# Patient Record
Sex: FEMALE | Race: WHITE | NOT HISPANIC OR LATINO | Employment: OTHER | URBAN - METROPOLITAN AREA
[De-identification: names, ages, dates, MRNs, and addresses within clinical notes are randomized per-mention and may not be internally consistent; named-entity substitution may affect disease eponyms.]

---

## 2022-11-29 ENCOUNTER — TRANSFERRED RECORDS (OUTPATIENT)
Dept: MULTI SPECIALTY CLINIC | Facility: CLINIC | Age: 67
End: 2022-11-29

## 2024-04-02 ENCOUNTER — OFFICE VISIT (OUTPATIENT)
Dept: FAMILY MEDICINE | Facility: CLINIC | Age: 69
End: 2024-04-02
Payer: COMMERCIAL

## 2024-04-02 VITALS
WEIGHT: 113 LBS | SYSTOLIC BLOOD PRESSURE: 124 MMHG | HEART RATE: 105 BPM | TEMPERATURE: 97.5 F | BODY MASS INDEX: 22.78 KG/M2 | RESPIRATION RATE: 18 BRPM | DIASTOLIC BLOOD PRESSURE: 78 MMHG | OXYGEN SATURATION: 99 % | HEIGHT: 59 IN

## 2024-04-02 DIAGNOSIS — Z12.11 SCREEN FOR COLON CANCER: ICD-10-CM

## 2024-04-02 DIAGNOSIS — N90.4 LICHEN SCLEROSUS OF VULVA: ICD-10-CM

## 2024-04-02 DIAGNOSIS — S76.011A STRAIN OF HIP AND THIGH, RIGHT, INITIAL ENCOUNTER: ICD-10-CM

## 2024-04-02 DIAGNOSIS — Z78.0 POST-MENOPAUSAL: ICD-10-CM

## 2024-04-02 DIAGNOSIS — Z13.1 SCREENING FOR DIABETES MELLITUS: ICD-10-CM

## 2024-04-02 DIAGNOSIS — R73.09 OTHER ABNORMAL GLUCOSE: ICD-10-CM

## 2024-04-02 DIAGNOSIS — S16.1XXA STRAIN OF NECK MUSCLE, INITIAL ENCOUNTER: ICD-10-CM

## 2024-04-02 DIAGNOSIS — Z91.89 AT RISK FOR DECREASED BONE DENSITY: ICD-10-CM

## 2024-04-02 DIAGNOSIS — Z13.220 SCREENING FOR HYPERLIPIDEMIA: ICD-10-CM

## 2024-04-02 DIAGNOSIS — Z12.4 CERVICAL CANCER SCREENING: ICD-10-CM

## 2024-04-02 DIAGNOSIS — Z00.00 ENCOUNTER FOR MEDICARE ANNUAL WELLNESS EXAM: Primary | ICD-10-CM

## 2024-04-02 DIAGNOSIS — S76.911A STRAIN OF HIP AND THIGH, RIGHT, INITIAL ENCOUNTER: ICD-10-CM

## 2024-04-02 DIAGNOSIS — Z91.89 DIETHYLSTILBESTROL (DES) EXPOSURE AS FETUS: ICD-10-CM

## 2024-04-02 DIAGNOSIS — Z80.8 FAMILY HISTORY OF SKIN CANCER: ICD-10-CM

## 2024-04-02 DIAGNOSIS — Z12.31 ENCOUNTER FOR SCREENING MAMMOGRAM FOR BREAST CANCER: ICD-10-CM

## 2024-04-02 PROBLEM — J30.2 SEASONAL ALLERGIC RHINITIS DUE TO FUNGAL SPORES: Status: ACTIVE | Noted: 2018-02-12

## 2024-04-02 LAB
CHOLEST SERPL-MCNC: 248 MG/DL
FASTING STATUS PATIENT QL REPORTED: YES
HBA1C MFR BLD: 6 % (ref 0–5.6)
HDLC SERPL-MCNC: 80 MG/DL
LDLC SERPL CALC-MCNC: 147 MG/DL
NONHDLC SERPL-MCNC: 168 MG/DL
TRIGL SERPL-MCNC: 106 MG/DL

## 2024-04-02 PROCEDURE — 80061 LIPID PANEL: CPT | Performed by: NURSE PRACTITIONER

## 2024-04-02 PROCEDURE — 36415 COLL VENOUS BLD VENIPUNCTURE: CPT | Performed by: NURSE PRACTITIONER

## 2024-04-02 PROCEDURE — G0123 SCREEN CERV/VAG THIN LAYER: HCPCS | Performed by: NURSE PRACTITIONER

## 2024-04-02 PROCEDURE — 83036 HEMOGLOBIN GLYCOSYLATED A1C: CPT | Performed by: NURSE PRACTITIONER

## 2024-04-02 PROCEDURE — G0438 PPPS, INITIAL VISIT: HCPCS | Performed by: NURSE PRACTITIONER

## 2024-04-02 PROCEDURE — 87624 HPV HI-RISK TYP POOLED RSLT: CPT | Mod: GZ | Performed by: NURSE PRACTITIONER

## 2024-04-02 PROCEDURE — 99204 OFFICE O/P NEW MOD 45 MIN: CPT | Mod: 25 | Performed by: NURSE PRACTITIONER

## 2024-04-02 RX ORDER — CLOBETASOL PROPIONATE 0.5 MG/G
OINTMENT TOPICAL AT BEDTIME
Qty: 60 G | Refills: 11 | Status: SHIPPED | OUTPATIENT
Start: 2024-04-02

## 2024-04-02 SDOH — HEALTH STABILITY: PHYSICAL HEALTH: ON AVERAGE, HOW MANY DAYS PER WEEK DO YOU ENGAGE IN MODERATE TO STRENUOUS EXERCISE (LIKE A BRISK WALK)?: 2 DAYS

## 2024-04-02 ASSESSMENT — SOCIAL DETERMINANTS OF HEALTH (SDOH): HOW OFTEN DO YOU GET TOGETHER WITH FRIENDS OR RELATIVES?: MORE THAN THREE TIMES A WEEK

## 2024-04-02 NOTE — PATIENT INSTRUCTIONS
Try voltaren gel to foot pad to see if this improves pain.  2.  Make appointment for Physical Therapy for hip and neck.  3. Use topical steroid cream as prescribed for lichen sclerosis  4.   ear wax drop (Debrox) Apply 4 drops in the right ear once a week to prevent wax build up. Rinse ear out in the shower.  5. Dermatology referral placed for skin check.  6. Make appointment for mammogram.  Preventive Care Advice.  This is general advice given by our system to help you stay healthy. However, your care team may have specific advice just for you. Please talk to your care team about your preventive care needs.  Nutrition  Eat 5 or more servings of fruits and vegetables each day.  Try wheat bread, brown rice and whole grain pasta (instead of white bread, rice, and pasta).  Get enough calcium and vitamin D. Check the label on foods and aim for 100% of the RDA (recommended daily allowance).  Lifestyle  Exercise at least 150 minutes each week   (30 minutes a day, 5 days a week).  Do muscle strengthening activities 2 days a week. These help control your weight and prevent disease.  No smoking.  Wear sunscreen to prevent skin cancer.  Have a dental exam and cleaning every 6 months.  Yearly exams  See your health care team every year to talk about:  Any changes in your health.  Any medicines your care team has prescribed.  Preventive care, family planning, and ways to prevent chronic diseases.  Shots (vaccines)   HPV shots (up to age 26), if you've never had them before.  Hepatitis B shots (up to age 59), if you've never had them before.  COVID-19 shot: Get this shot when it's due.  Flu shot: Get a flu shot every year.  Tetanus shot: Get a tetanus shot every 10 years.  Pneumococcal, hepatitis A, and RSV shots: Ask your care team if you need these based on your risk.  Shingles shot (for age 50 and up).  General health tests  Diabetes screening:  Starting at age 35, Get screened for diabetes at least every 3 years.  If  you are younger than age 35, ask your care team if you should be screened for diabetes.  Cholesterol test: At age 39, start having a cholesterol test every 5 years, or more often if advised.  Bone density scan (DEXA): At age 50, ask your care team if you should have this scan for osteoporosis (brittle bones).  Hepatitis C: Get tested at least once in your life.  STIs (sexually transmitted infections)  Before age 24: Ask your care team if you should be screened for STIs.  After age 24: Get screened for STIs if you're at risk. You are at risk for STIs (including HIV) if:  You are sexually active with more than one person.  You don't use condoms every time.  You or a partner was diagnosed with a sexually transmitted infection.  If you are at risk for HIV, ask about PrEP medicine to prevent HIV.  Get tested for HIV at least once in your life, whether you are at risk for HIV or not.  Cancer screening tests  Cervical cancer screening: If you have a cervix, begin getting regular cervical cancer screening tests at age 21. Most people who have regular screenings with normal results can stop after age 65. Talk about this with your provider.  Breast cancer scan (mammogram): If you've ever had breasts, begin having regular mammograms starting at age 40. This is a scan to check for breast cancer.  Colon cancer screening: It is important to start screening for colon cancer at age 45.  Have a colonoscopy test every 10 years (or more often if you're at risk) Or, ask your provider about stool tests like a FIT test every year or Cologuard test every 3 years.  To learn more about your testing options, visit: https://www.[employer]/326752.pdf.  For help making a decision, visit: https://bit.ly/mq39591.  Prostate cancer screening test: If you have a prostate and are age 55 to 69, ask your provider if you would benefit from a yearly prostate cancer screening test.  Lung cancer screening: If you are a current or former smoker age 50 to  80, ask your care team if ongoing lung cancer screenings are right for you.  For informational purposes only. Not to replace the advice of your health care provider. Copyright   2023 [facility]. All rights reserved. Clinically reviewed by the [facility]. Yugma 516257 - REV 01/24.    Preventing Falls: Care Instructions  Injuries and health problems such as trouble walking or poor eyesight can increase your risk of falling. So can some medicines. But there are things you can do to help prevent falls. You can exercise to get stronger. You can also arrange your home to make it safer.    Talk to your doctor about the medicines you take. Ask if any of them increase the risk of falls and whether they can be changed or stopped.   Try to exercise regularly. It can help improve your strength and balance. This can help lower your risk of falling.     Practice fall safety and prevention.    Wear low-heeled shoes that fit well and give your feet good support. Talk to your doctor if you have foot problems that make this hard.  Carry a cellphone or wear a medical alert device that you can use to call for help.  Use stepladders instead of chairs to reach high objects. Don't climb if you're at risk for falls. Ask for help, if needed.  Wear the correct eyeglasses, if you need them.    Make your home safer.    Remove rugs, cords, clutter, and furniture from walkways.  Keep your house well lit. Use night-lights in hallways and bathrooms.  Install and use sturdy handrails on stairways.  Wear nonskid footwear, even inside. Don't walk barefoot or in socks without shoes.    Be safe outside.    Use handrails, curb cuts, and ramps whenever possible.  Keep your hands free by using a shoulder bag or backpack.  Try to walk in well-lit areas. Watch out for uneven ground, changes in pavement, and debris.  Be careful in the winter. Walk on the grass or gravel when sidewalks are slippery. Use de-icer on  "steps and walkways. Add non-slip devices to shoes.    Put grab bars and nonskid mats in your shower or tub and near the toilet. Try to use a shower chair or bath bench when bathing.   Get into a tub or shower by putting in your weaker leg first. Get out with your strong side first. Have a phone or medical alert device in the bathroom with you.   Where can you learn more?  Go to https://www.[employer].net/patiented  Enter G117 in the search box to learn more about \"Preventing Falls: Care Instructions.\"  Current as of: July 17, 2023               Content Version: 14.0    3173-7015 [employer].   Care instructions adapted under license by your healthcare professional. If you have questions about a medical condition or this instruction, always ask your healthcare professional. [employer] disclaims any warranty or liability for your use of this information.      Learning About Stress  What is stress?     Stress is your body's response to a hard situation. Your body can have a physical, emotional, or mental response. Stress is a fact of life for most people, and it affects everyone differently. What causes stress for you may not be stressful for someone else.  A lot of things can cause stress. You may feel stress when you go on a job interview, take a test, or run a race. This kind of short-term stress is normal and even useful. It can help you if you need to work hard or react quickly. For example, stress can help you finish an important job on time.  Long-term stress is caused by ongoing stressful situations or events. Examples of long-term stress include long-term health problems, ongoing problems at work, or conflicts in your family. Long-term stress can harm your health.  How does stress affect your health?  When you are stressed, your body responds as though you are in danger. It makes hormones that speed up your heart, make you breathe faster, and give you a burst of energy. This is " called the fight-or-flight stress response. If the stress is over quickly, your body goes back to normal and no harm is done.  But if stress happens too often or lasts too long, it can have bad effects. Long-term stress can make you more likely to get sick, and it can make symptoms of some diseases worse. If you tense up when you are stressed, you may develop neck, shoulder, or low back pain. Stress is linked to high blood pressure and heart disease.  Stress also harms your emotional health. It can make you young, tense, or depressed. Your relationships may suffer, and you may not do well at work or school.  What can you do to manage stress?  You can try these things to help manage stress:   Do something active. Exercise or activity can help reduce stress. Walking is a great way to get started. Even everyday activities such as housecleaning or yard work can help.  Try yoga or marcy chi. These techniques combine exercise and meditation. You may need some training at first to learn them.  Do something you enjoy. For example, listen to music or go to a movie. Practice your hobby or do volunteer work.  Meditate. This can help you relax, because you are not worrying about what happened before or what may happen in the future.  Do guided imagery. Imagine yourself in any setting that helps you feel calm. You can use online videos, books, or a teacher to guide you.  Do breathing exercises. For example:  From a standing position, bend forward from the waist with your knees slightly bent. Let your arms dangle close to the floor.  Breathe in slowly and deeply as you return to a standing position. Roll up slowly and lift your head last.  Hold your breath for just a few seconds in the standing position.  Breathe out slowly and bend forward from the waist.  Let your feelings out. Talk, laugh, cry, and express anger when you need to. Talking with supportive friends or family, a counselor, or a johnathon leader about your feelings is a  "healthy way to relieve stress. Avoid discussing your feelings with people who make you feel worse.  Write. It may help to write about things that are bothering you. This helps you find out how much stress you feel and what is causing it. When you know this, you can find better ways to cope.  What can you do to prevent stress?  You might try some of these things to help prevent stress:  Manage your time. This helps you find time to do the things you want and need to do.  Get enough sleep. Your body recovers from the stresses of the day while you are sleeping.  Get support. Your family, friends, and community can make a difference in how you experience stress.  Limit your news feed. Avoid or limit time on social media or news that may make you feel stressed.  Do something active. Exercise or activity can help reduce stress. Walking is a great way to get started.  Where can you learn more?  Go to https://www.[employer].net/patiented  Enter N032 in the search box to learn more about \"Learning About Stress.\"  Current as of: October 24, 2023               Content Version: 14.0    8310-6302 [employer].   Care instructions adapted under license by your healthcare professional. If you have questions about a medical condition or this instruction, always ask your healthcare professional. [employer] disclaims any warranty or liability for your use of this information.      "

## 2024-04-02 NOTE — PROGRESS NOTES
Preventive Care Visit  [facility]  Marnie Mitchell NP, Family Medicine  Apr 2, 2024    Assessment & Plan     Encounter for Medicare annual wellness exam  Mammogram, DEXA scan, A1C and lipids ordered for screening.  Paperwork given for advance care planning.  She declines risk for Hep B vaccination.  Reviewed preventative health recommendations and advised follow-up in 1 year for annual wellness exam.  - REVIEW OF HEALTH MAINTENANCE PROTOCOL ORDERS  - PRIMARY CARE FOLLOW-UP SCHEDULING; Future    Screening for diabetes mellitus  - Hemoglobin A1c; Future  - Hemoglobin A1c    Screening for hyperlipidemia  - Lipid panel reflex to direct LDL Non-fasting; Future  - Lipid panel reflex to direct LDL Non-fasting    Encounter for screening mammogram for breast cancer  - MA Screen Bilateral w/Deshawn; Future    Screen for colon cancer  Updated her recheck in health maintenance    At risk for decreased bone density  Dexa scan ordered    Post-menopausal  Dexa scan ordered      Diethylstilbestrol (GAY) exposure as fetus  PAP will need to be done yearly due to this exposure until she reaches an age that she will not need to worry about this due to health conditions.  - Pap screen with HPV - recommended age 30 - 65 years  - HPV Hold (Lab Only)    Cervical cancer screening  See note above.  - Pap screen with HPV - recommended age 30 - 65 years  - HPV Hold (Lab Only)    Strain of neck muscle, initial encounter  Physical Therapy ordered for evaluation and treatment of neck strain.  - Physical Therapy  Referral; Future    Strain of hip and thigh, right, initial encounter  Physical Therapy ordered for upper hip and thigh strain.  - Physical Therapy  Referral; Future    Family history of skin cancer  Dermatology referral placed for skin check per patient request.  - Adult Dermatology  Referral; Future    Lichen sclerosus of vulva  Treating with clobetasol at bedtime daily for 3  months and then reduce to 3 times weekly, then 1-2 times weekly to control symptoms.  - clobetasol (TEMOVATE) 0.05 % external ointment; Apply topically at bedtime For 3 months, then reduce to 3 times weekly and continue to reduce to what keeps symptoms at bay 1-2 time weekly    Other abnormal glucose  - Hemoglobin A1c; Future  - Hemoglobin A1c    Patient has been advised of split billing requirements and indicates understanding: Yes    Counseling  Appropriate preventive services were discussed with this patient, including applicable screening as appropriate for fall prevention, nutrition, physical activity, Tobacco-use cessation, weight loss and cognition.  Checklist reviewing preventive services available has been given to the patient.  Reviewed patient's diet, addressing concerns and/or questions.   She is at risk for lack of exercise and has been provided with information to increase physical activity for the benefit of her well-being.   The patient was instructed to see the dentist every 6 months.       See Patient Instructions    Subjective   Magui is a 68 year old, presenting for the following:  Wellness Visit        4/2/2024    10:23 AM   Additional Questions   Roomed by rmb   Accompanied by self         4/2/2024    10:23 AM   Patient Reported Additional Medications   Patient reports taking the following new medications none         Via the Health Maintenance questionnaire, the patient has reported the following services have been completed -Mammogram-Colonscopy, this information has been sent to the abstraction team.  Health Care Directive  Patient does not have a Health Care Directive or Living Will: Discussed advance care planning with patient; information given to patient to review.    HPI        4/2/2024   General Health   How would you rate your overall physical health? Excellent   Feel stress (tense, anxious, or unable to sleep) To some extent   (!) STRESS CONCERN      4/2/2024   Nutrition   Diet:  Regular (no restrictions)         4/2/2024   Exercise   Days per week of moderate/strenous exercise 2 days   (!) EXERCISE CONCERN      4/2/2024   Social Factors   Frequency of gathering with friends or relatives More than three times a week   Worry food won't last until get money to buy more No   Food not last or not have enough money for food? No   Do you have housing?  Yes   Are you worried about losing your housing? No   Lack of transportation? No   Unable to get utilities (heat,electricity)? No         4/2/2024   Activities of Daily Living- Home Safety   Needs help with the following daily activites None of the above   Safety concerns in the home None of the above         4/2/2024   Dental   Dentist two times every year? (!) NO   Making a dentist appointment.      4/2/2024   Hearing Screening   Hearing concerns? None of the above         4/2/2024   Driving Risk Screening   Patient/family members have concerns about driving No         4/2/2024   General Alertness/Fatigue Screening   Have you been more tired than usual lately? No         4/2/2024   Urinary Incontinence Screening   Bothered by leaking urine in past 6 months No         4/2/2024   TB Screening   Were you born outside of the [place]? No         Today's PHQ-2 Score:       4/2/2024    10:07 AM   PHQ-2 ( 1999 Pf[employer])   Q1: Little interest or pleasure in doing things 0   Q2: Feeling down, depressed or hopeless 0   PHQ-2 Score 0   Q1: Little interest or pleasure in doing things Not at all   Q2: Feeling down, depressed or hopeless Not at all   PHQ-2 Score 0           4/2/2024   Substance Use   Alcohol more than 3/day or more than 7/wk Not Applicable   Do you have a current opioid prescription? No   How severe/bad is pain from 1 to 10? 1/10   Do you use any other substances recreationally? No     Social History     Tobacco Use    Smoking status: Never    Smokeless tobacco: Never   Vaping Use    Vaping Use: Never used   Substance Use Topics    Alcohol use: Never     Drug use: Never      Mammogram Screening - Mammogram every 1 years updated in Health Maintenance based on mutual decision making    ASCVD Risk   The ASCVD Risk score (Aidee RENNER, et al., 2019) failed to calculate for the following reasons:    Cannot find a previous HDL lab    Cannot find a previous total cholesterol lab    The BP treatment status is invalid    Reviewed and updated as needed this visit by Provider    Allergies  Meds  Problems  Med Hx  Surg Hx  Fam Hx   Sexual   Activity          History reviewed. No pertinent past medical history.  Past Surgical History:   Procedure Laterality Date    right shoulder arthroscopy       Lab work is in process  Labs reviewed in EPIC  BP Readings from Last 3 Encounters:   04/02/24 (!) 152/78    Wt Readings from Last 3 Encounters:   04/02/24 51.3 kg (113 lb)                  Patient Active Problem List   Diagnosis    Seasonal allergic rhinitis due to fungal spores    Diethylstilbestrol (GAY) exposure as fetus     Past Surgical History:   Procedure Laterality Date    right shoulder arthroscopy         Social History     Tobacco Use    Smoking status: Never    Smokeless tobacco: Never   Substance Use Topics    Alcohol use: Never     Family History   Problem Relation Age of Onset    Hypertension Mother     Prostate Cancer Father     Brain Tumor Father          Current Outpatient Medications   Medication Sig Dispense Refill    Calcium Carbonate-Vitamin D 250-3.125 MG-MCG TABS Take  by mouth two times a day.       Allergies   Allergen Reactions    Dust Mite Extract Unknown     Stuffy nose    Other reaction(s): *Unknown    Kiwi Extract      Lips swell    Latex Other (See Comments)     Other reaction(s): *Unknown   Sensitivity due to Kiwi allergy    Sensitivity due to Kiwi allergy    Mold Unknown     Other reaction(s): *Unknown   Stuffy nose    Molds & Smuts      Stuffy nose    Nsaids      GI upset    Other Environmental Allergy      kiwi, dust, molds, aerosol  sprays    Other Food Allergy      kiwi, dust, molds, aerosol sprays     Current providers sharing in care for this patient include:  Patient Care Team:  Marnie Mitchell, NP as PCP - General (Family Medicine)    The following health maintenance items are reviewed in Epic and correct as of today:  Health Maintenance   Topic Date Due    DEXA  Never done    ADVANCE CARE PLANNING  Never done    MAMMO SCREENING  Never done    GLUCOSE  Never done    HEPATITIS C SCREENING  Never done    LIPID  Never done    IPV IMMUNIZATION (2 of 3 - Adult catch-up series) 05/31/2012    ANNUAL REVIEW OF HM ORDERS  04/02/2025    FALL RISK ASSESSMENT  04/02/2025    DTAP/TDAP/TD IMMUNIZATION (2 - Td or Tdap) 06/19/2025    COLORECTAL CANCER SCREENING  08/15/2026    PHQ-2 (once per calendar year)  Completed    INFLUENZA VACCINE  Completed    Pneumococcal Vaccine: 65+ Years  Completed    ZOSTER IMMUNIZATION  Completed    RSV VACCINE (Pregnancy & 60+)  Completed    COVID-19 Vaccine  Completed    HPV IMMUNIZATION  Aged Out    MENINGITIS IMMUNIZATION  Aged Out    RSV MONOCLONAL ANTIBODY  Aged Out         Review of Systems  CONSTITUTIONAL: NEGATIVE for fever, chills, change in weight  INTEGUMENTARY/SKIN: POSITIVE for folliculitis after using hair products on scalp, labial itching  ENT/MOUTH: NEGATIVE for ear, mouth and throat problems  RESP: NEGATIVE for significant cough or SOB  CV: NEGATIVE for chest pain, palpitations or peripheral edema  GI: NEGATIVE for nausea, abdominal pain, heartburn, or change in bowel habits  : normal menstrual cycles  MUSCULOSKELETAL: NEGATIVE for significant arthralgias or myalgia  NEURO: NEGATIVE for weakness, dizziness or paresthesias  ENDOCRINE: NEGATIVE for temperature intolerance, skin/hair changes  HEME/ALLERGY/IMMUNE: NEGATIVE for bleeding problems  PSYCHIATRIC: NEGATIVE for changes in mood or affect  ROS otherwise negative     Objective    Exam  BP (!) 152/78   Pulse 105   Temp 97.5  F (36.4  C)  "(Tympanic)   Resp 18   Ht 1.488 m (4' 10.58\")   Wt 51.3 kg (113 lb)   SpO2 99%   BMI 23.15 kg/m     Estimated body mass index is 23.15 kg/m  as calculated from the following:    Height as of this encounter: 1.488 m (4' 10.58\").    Weight as of this encounter: 51.3 kg (113 lb).    Physical Exam  GENERAL: alert and no distress  EYES: Eyes grossly normal to inspection, PERRL and conjunctivae and sclerae normal  HENT: ear canals and TM's normal, nose and mouth without ulcers or lesions  NECK: no adenopathy, no asymmetry, masses, or scars  RESP: lungs clear to auscultation - no rales, rhonchi or wheezes  CV: regular rate and rhythm, normal S1 S2, no S3 or S4, no murmur, click or rub, no peripheral edema  ABDOMEN: soft, nontender, no hepatosplenomegaly, no masses and bowel sounds normal   (female): normal female external genitalia, normal urethral meatus, normal vaginal mucosa, normal cervix without masses or discharge; PAP collected on exam  MS: no gross musculoskeletal defects noted, no edema  SKIN: no suspicious lesions or rashes  NEURO: Normal strength and tone, mentation intact and speech normal  BACK: no CVA tenderness, no paralumbar tenderness  PSYCH: mentation appears normal, affect normal/bright  LYMPH: normal ant/post cervical, supraclavicular nodes  inguinal: no adenopathy         4/2/2024   Mini Cog   Clock Draw Score 2 Normal   3 Item Recall 3 objects recalled   Mini Cog Total Score 5     Signed Electronically by: Marnie Mitchell NP    "

## 2024-04-02 NOTE — LETTER
April 4, 2024      Magui Avalos  [address] [facility] [address]        Dear ,    We are writing to inform you of your test results.    Test results indicate you may require additional follow up, see comment below.    our A1C shows you are prediabetic.  I am putting in a diabetic education referral to discuss what this is and how to keep from developing this into diabetes with diet changes.     Your lipids show slight elevation.     High Cholesterol     We think about cholesterol differently than we did several years ago.     1. The first goal is to improve your lifestyle.  Do not smoke. Exercise for 20-30 minutes most days of the weeks.  Follow a healthy diet with at least 5 servings of fresh, whole fruits and vegetables per day, whole grains, nuts, seeds, legumes, high fiber foods, limit sugar and processed foods, drink water.  Good article on healthy eating     http://www.iPerceptions/2018/03/ultimate-conversation-on-healthy-eating-and-nutrition.html     2.  The second goal is to decrease the risk of cardiovascular events - heart attack and stroke.  Reducing absolute levels of cholesterol is no longer a therapeutic goal.  The 2013 American College of Cardiology and American Heart Association guidelines no longer base treatment decisions on these values; instead, they focus on identifying high- and medium-risk people who would benefit from treatment.  We can calculate your risk:  you are at 6.3% and we only treat with medication over 7%.     Work on your diet over the next year to reduce cholesterol.  We will recheck this again in a year.     Marnie Mitchell NP on 4/4/2024 at 12:45 PM     Resulted Orders   Pap screen with HPV - recommended age 30 - 65 years   Result Value Ref Range    Interpretation        Negative for Intraepithelial Lesion or Malignancy (NILM)    Comment         Papanicolaou Test Limitations:  Cervical cytology is a screening test with limited sensitivity, and regular  screening is critical for cancer prevention.  Pap tests are primarily effective for the diagnosis/prevention of squamous cell carcinoma, not adenocarcinoma or other cancers.        Specimen Adequacy       Satisfactory for evaluation.  Specimen was unable to be imaged on the [employer] Slide .  Transformation zone component absent, atrophy    Clinical Information       post-menopausal      LMP/Menopause Date       [2012      Reflex Testing Yes regardless of result     Previous Abnormal?       No      Performing Labs       The technical component of this testing was completed at [facility]     Lipid panel reflex to direct LDL Non-fasting   Result Value Ref Range    Cholesterol 248 (H) <200 mg/dL    Triglycerides 106 <150 mg/dL    Direct Measure HDL 80 >=50 mg/dL    LDL Cholesterol Calculated 147 (H) <=100 mg/dL    Non HDL Cholesterol 168 (H) <130 mg/dL    Patient Fasting > 8hrs? Yes     Narrative    Cholesterol  Desirable:  <200 mg/dL    Triglycerides  Normal:  Less than 150 mg/dL  Borderline High:  150-199 mg/dL  High:  200-499 mg/dL  Very High:  Greater than or equal to 500 mg/dL    Direct Measure HDL  Female:  Greater than or equal to 50 mg/dL   Male:  Greater than or equal to 40 mg/dL    LDL Cholesterol  Desirable:  <100mg/dL  Above Desirable:  100-129 mg/dL   Borderline High:  130-159 mg/dL   High:  160-189 mg/dL   Very High:  >= 190 mg/dL    Non HDL Cholesterol  Desirable:  130 mg/dL  Above Desirable:  130-159 mg/dL  Borderline High:  160-189 mg/dL  High:  190-219 mg/dL  Very High:  Greater than or equal to 220 mg/dL   Hemoglobin A1c   Result Value Ref Range    Hemoglobin A1C 6.0 (H) 0.0 - 5.6 %      Comment:      Normal <5.7%   Prediabetes 5.7-6.4%    Diabetes 6.5% or higher     Note: Adopted from ADA consensus guidelines.       If you have any questions or concerns, please call the clinic at the number listed above.        Sincerely,      Marnie Mitchell NP

## 2024-04-02 NOTE — LETTER
April 9, 2024      Magui S Robbie  [address] [facility] [address]        Dear MsJace,    We are writing to inform you of your test results.    Your PAP and HPV are normal.  Plan to follow-up in 1 year.     Resulted Orders   Pap screen with HPV - recommended age 30 - 65 years   Result Value Ref Range    Interpretation        Negative for Intraepithelial Lesion or Malignancy (NILM)    Comment         Papanicolaou Test Limitations:  Cervical cytology is a screening test with limited sensitivity, and regular screening is critical for cancer prevention.  Pap tests are primarily effective for the diagnosis/prevention of squamous cell carcinoma, not adenocarcinoma or other cancers.        Specimen Adequacy       Satisfactory for evaluation.  Specimen was unable to be imaged on the [employer] Slide .  Transformation zone component absent, atrophy    Clinical Information       post-menopausal      LMP/Menopause Date       [2012      Reflex Testing Yes regardless of result     Previous Abnormal?       No      Performing Labs       The technical component of this testing was completed at [facility]     Lipid panel reflex to direct LDL Non-fasting   Result Value Ref Range    Cholesterol 248 (H) <200 mg/dL    Triglycerides 106 <150 mg/dL    Direct Measure HDL 80 >=50 mg/dL    LDL Cholesterol Calculated 147 (H) <=100 mg/dL    Non HDL Cholesterol 168 (H) <130 mg/dL    Patient Fasting > 8hrs? Yes     Narrative    Cholesterol  Desirable:  <200 mg/dL    Triglycerides  Normal:  Less than 150 mg/dL  Borderline High:  150-199 mg/dL  High:  200-499 mg/dL  Very High:  Greater than or equal to 500 mg/dL    Direct Measure HDL  Female:  Greater than or equal to 50 mg/dL   Male:  Greater than or equal to 40 mg/dL    LDL Cholesterol  Desirable:  <100mg/dL  Above Desirable:  100-129 mg/dL   Borderline High:  130-159 mg/dL   High:  160-189 mg/dL   Very High:  >= 190  mg/dL    Non HDL Cholesterol  Desirable:  130 mg/dL  Above Desirable:  130-159 mg/dL  Borderline High:  160-189 mg/dL  High:  190-219 mg/dL  Very High:  Greater than or equal to 220 mg/dL   Hemoglobin A1c   Result Value Ref Range    Hemoglobin A1C 6.0 (H) 0.0 - 5.6 %      Comment:      Normal <5.7%   Prediabetes 5.7-6.4%    Diabetes 6.5% or higher     Note: Adopted from ADA consensus guidelines.   HPV High Risk Types DNA Cervical   Result Value Ref Range    Other HR HPV Negative Negative    HPV16 DNA Negative Negative    HPV18 DNA Negative Negative    FINAL DIAGNOSIS       This patient's sample is negative for HPV DNA.        This test was developed and its performance characteristics determined by the [facility]. It has not been cleared or approved by the FDA. The laboratory is regulated under CLIA as qualified to perform high-complexity testing. This test is used for clinical purposes. It should not be regarded as investigational or for research.    METHODOLOGY: The Roche Junie 4800 system uses automated extraction, simultaneous amplification of HPV (L1 region) and beta-globin, followed by real time detection of fluorescent labeled HPV and beta globin using specific oligonucleotide probes. The test specifically identifies types HPV 16 DNA and HPV 18 DNA while concurrently detecting the rest of the high risk types (31, 33, 35, 39, 45, 51, 52, 56, 58, 59, 66 or 68).    COMMENTS: This test is not intended for use as a screening device for woman under age 30 with normal cervical cytology. Results should be correlated with cytologic and histologic findings. Close clinical followup is recommended.           If you have any questions or concerns, please call the clinic at the number listed above.       Sincerely,      Marnie Mitchell NP

## 2024-04-04 DIAGNOSIS — R73.03 PREDIABETES: Primary | ICD-10-CM

## 2024-04-04 LAB
BKR LAB AP GYN ADEQUACY: NORMAL
BKR LAB AP GYN INTERPRETATION: NORMAL
BKR LAB AP HPV REFLEX: NORMAL
BKR LAB AP LMP: NORMAL
BKR LAB AP PREVIOUS ABNORMAL: NORMAL
PATH REPORT.COMMENTS IMP SPEC: NORMAL
PATH REPORT.COMMENTS IMP SPEC: NORMAL
PATH REPORT.RELEVANT HX SPEC: NORMAL

## 2024-04-08 LAB
HUMAN PAPILLOMA VIRUS 16 DNA: NEGATIVE
HUMAN PAPILLOMA VIRUS 18 DNA: NEGATIVE
HUMAN PAPILLOMA VIRUS FINAL DIAGNOSIS: NORMAL
HUMAN PAPILLOMA VIRUS OTHER HR: NEGATIVE

## 2024-04-09 ENCOUNTER — PATIENT OUTREACH (OUTPATIENT)
Dept: FAMILY MEDICINE | Facility: CLINIC | Age: 69
End: 2024-04-09
Payer: COMMERCIAL

## 2024-04-09 NOTE — TELEPHONE ENCOUNTER
4/2/24 NIL, Neg HPV, 68 yr old. GAY exposure, requires yearly paps until reaches age where no longer able

## 2024-04-10 ENCOUNTER — ANCILLARY PROCEDURE (OUTPATIENT)
Dept: MAMMOGRAPHY | Facility: CLINIC | Age: 69
End: 2024-04-10
Attending: NURSE PRACTITIONER
Payer: COMMERCIAL

## 2024-04-10 DIAGNOSIS — Z12.31 ENCOUNTER FOR SCREENING MAMMOGRAM FOR BREAST CANCER: ICD-10-CM

## 2024-04-10 PROCEDURE — 77067 SCR MAMMO BI INCL CAD: CPT | Mod: TC | Performed by: RADIOLOGY

## 2024-04-10 PROCEDURE — 77063 BREAST TOMOSYNTHESIS BI: CPT | Mod: TC | Performed by: RADIOLOGY

## 2024-04-12 ENCOUNTER — HOSPITAL ENCOUNTER (OUTPATIENT)
Dept: BONE DENSITY | Facility: CLINIC | Age: 69
Discharge: HOME | End: 2024-04-12
Attending: NURSE PRACTITIONER | Admitting: NURSE PRACTITIONER
Payer: MEDICARE

## 2024-04-12 DIAGNOSIS — Z78.0 POST-MENOPAUSAL: ICD-10-CM

## 2024-04-12 PROCEDURE — 77080 DXA BONE DENSITY AXIAL: CPT

## 2024-04-29 ENCOUNTER — HOSPITAL ENCOUNTER (OUTPATIENT)
Dept: ULTRASOUND IMAGING | Facility: CLINIC | Age: 69
Discharge: HOME | End: 2024-04-29
Attending: NURSE PRACTITIONER
Payer: MEDICARE

## 2024-04-29 ENCOUNTER — HOSPITAL ENCOUNTER (OUTPATIENT)
Dept: MAMMOGRAPHY | Facility: CLINIC | Age: 69
Discharge: HOME | End: 2024-04-29
Attending: NURSE PRACTITIONER
Payer: MEDICARE

## 2024-04-29 DIAGNOSIS — R92.8 ABNORMAL MAMMOGRAM: ICD-10-CM

## 2024-04-29 PROCEDURE — 77065 DX MAMMO INCL CAD UNI: CPT | Mod: LT

## 2024-04-29 PROCEDURE — 76642 ULTRASOUND BREAST LIMITED: CPT | Mod: LT

## 2024-05-01 ENCOUNTER — VIRTUAL VISIT (OUTPATIENT)
Dept: EDUCATION SERVICES | Facility: CLINIC | Age: 69
End: 2024-05-01
Attending: NURSE PRACTITIONER
Payer: COMMERCIAL

## 2024-05-01 DIAGNOSIS — R73.03 PREDIABETES: Primary | ICD-10-CM

## 2024-05-01 PROCEDURE — 97802 MEDICAL NUTRITION INDIV IN: CPT | Mod: 95 | Performed by: REGISTERED NURSE

## 2024-05-01 NOTE — CONFIDENTIAL NOTE
"Medical Nutrition Therapy  Visit Type:{:878045}    Magui Avalos presents today for MNT and education related to {:211561}.   She is accompanied by {:630981}.     ASSESSMENT:   Patient comments/concerns relating to nutrition: ***    NUTRITION HISTORY:    Breakfast: ***  Lunch: ***  Dinner: ***  Snacks: ***  Beverages: {:172540}    Misses meals? ***  Eats out:  {:743543}     Previous diet education:  {YES/NO :508038::\"Yes\"}     Food allergies/intolerances: ***    Diet is high in: {:574000}  Diet is low in: {:715436}    EXERCISE: {EXERCISE PATTERN:868872}    SOCIO/ECONOMIC:   Lives with: {:563689}    MEDICATIONS:  Current Outpatient Medications   Medication Sig Dispense Refill    Calcium Carbonate-Vitamin D 250-3.125 MG-MCG TABS Take  by mouth two times a day.      clobetasol (TEMOVATE) 0.05 % external ointment Apply topically at bedtime For 3 months, then reduce to 3 times weekly and continue to reduce to what keeps symptoms at bay 1-2 time weekly 60 g 11     No current facility-administered medications for this visit.       LABS:  No results found for: \"NA\"   No results found for: \"POTASSIUM\"  No results found for: \"CHLORIDE\"  No results found for: \"DIPAK\"  No results found for: \"CO2\"  No results found for: \"BUN\"  No results found for: \"CR\"  No results found for: \"GLC\"  Lab Results   Component Value Date     04/02/2024     Direct Measure HDL   Date Value Ref Range Status   04/02/2024 80 >=50 mg/dL Final   ]  No results found for: \"GFRESTIMATED\"  No results found for: \"CR\"  No results found for: \"MICROALBUMIN\"    ANTHROPOMETRICS:  Vitals: There were no vitals taken for this visit.  There is no height or weight on file to calculate BMI.      Wt Readings from Last 5 Encounters:   04/02/24 51.3 kg (113 lb)       Weight Change: ***    ESTIMATED KCAL REQUIREMENTS:  *** kcal per day    NUTRITION DIAGNOSIS: {:824820} related to *** as evidenced by ***    NUTRITION INTERVENTION:  {:715813}    PATIENT'S BEHAVIOR " CHANGE GOALS:   See Patient Instructions for patient stated behavior change goals. AVS was printed and given to patient at today's appointment.    MONITOR / EVALUATE:  RD will monitor/evaluate:  {:177449}    FOLLOW-UP:  {:054610}    ***  Time spent in minutes: ***  Encounter: Individual

## 2024-05-01 NOTE — PROGRESS NOTES
"Medical Nutrition Therapy  Type of service:  Video Visit    If the video visit is dropped, the video visit invitation should be resent by: Send to e-mail at: [email or web address]    Originating Location (pt. Location): Home  Distant Location (provider location): Offsite  Mode of Communication:  Video Conference via [employer]    Video Start Time:  10:28 AM  Video End Time (time video stopped): 11:33 AM    How would patient like to obtain AVS? Mail a copy   Visit Type:Initial assessment and intervention  Magui Avalos presents today for MNT and education related to prediabetes.   She is accompanied by self.     ASSESSMENT:   Patient comments/concerns relating to nutrition:   Magui has made significant changes since learning her A1c was elevated. Fasting for 12 hours. No snacking at night.  Walking 30-40 minutes per day  Increased water intake (9 cups per day)  Changed to Whole grain carbohydrates and decreased starch portions in half.  Eating a lot of organic vegetables.  Significantly decreased intake of sweets.   Lost 10 pounds in the past month.  NUTRITION HISTORY:  Eating small amounts every 2 hours.  Breakfast: low fat yogurt or low sugar peanut butter with organic cinnamon, melania  seeds, flax seeds and organic walnuts. Water and organic coffee.  Lunch: 1/2 cup whole grain pasta or brown rice or potatoes with raw veggies, cherry tomatoes or greens (kale) and tofu or low fat cottage cheese or salad with melania seeds. 2 dates or 1 oz dark chocolate for her daily \"treat\".  40 minute walk after lunch.  Dinner: similar to lunch meal but without the dates or dark chocolate.  Snacks: organic unsalted nuts, whole wheat bread, cherry tomatoes, berries  Beverages:   Green tea, coffee, water  Misses meals? no    Previous diet education:  No     Food allergies/intolerances: none    SOCIO/ECONOMIC:   Lives with: self and spouse. They eat differently and cook for themselves    MEDICATIONS:  Current Outpatient " "Medications   Medication Sig Dispense Refill    Calcium Carbonate-Vitamin D 250-3.125 MG-MCG TABS Take  by mouth two times a day.      clobetasol (TEMOVATE) 0.05 % external ointment Apply topically at bedtime For 3 months, then reduce to 3 times weekly and continue to reduce to what keeps symptoms at bay 1-2 time weekly 60 g 11     No current facility-administered medications for this visit.       LABS:  No results found for: \"NA\"   No results found for: \"POTASSIUM\"  No results found for: \"CHLORIDE\"  No results found for: \"DIPAK\"  No results found for: \"CO2\"  No results found for: \"BUN\"  No results found for: \"CR\"  No results found for: \"GLC\"  Lab Results   Component Value Date     04/02/2024     Direct Measure HDL   Date Value Ref Range Status   04/02/2024 80 >=50 mg/dL Final   ]  No results found for: \"GFRESTIMATED\"  No results found for: \"CR\"  No results found for: \"MICROALBUMIN\"    ANTHROPOMETRICS:  Vitals:   Height: 4'10.58\"  BMI: 23.1 (4/2/24)  BMI: 20.5 currently using home weight of 100 pounds    Wt Readings from Last 5 Encounters:   04/02/24 51.3 kg (113 lb)     Weight Change: decrease of 10 pounds    NUTRITION DIAGNOSIS: Prediabetes related to excess carbohydrate intake as evidenced by A1c of 6%.    NUTRITION INTERVENTION:  Education Materials Provided: My Plate Planner/Choose My Plate, Label Reading, 100 Calorie Snacks, Heart Healthy Food Choices, and Fiber Facts    PATIENT'S BEHAVIOR CHANGE GOALS:   See Patient Instructions for patient stated behavior change goals. AVS was printed and given to patient at today's appointment.    MONITOR / EVALUATE:  RD will monitor/evaluate:  Blood Glucose / A1c  Food and nutrition knowledge / skills  Readiness to change nutrition-related behaviors  Weight change    FOLLOW-UP:  Call or email RD with questions/concerns.     Anne-Marie Tinsley RD, LD, CDCES   Certified Diabetes Care and   [facility] Tuesdays and Thursdays  M " [facility] Wednesdays-virtual visits only  Time spent in minutes: 60  Encounter: Individual

## 2024-05-01 NOTE — LETTER
"    5/1/2024         RE: Magui Avalos  [address]  \[facility]\"" [address]        Dear Colleague,    Thank you for referring your patient, Magui Avalos, to the [facility]. Please see a copy of my visit note below.    Medical Nutrition Therapy  Type of service:  Video Visit    If the video visit is dropped, the video visit invitation should be resent by: Send to e-mail at: [email or web address]    Originating Location (pt. Location): Home  Distant Location (provider location): Offsite  Mode of Communication:  Video Conference via [employer]    Video Start Time:  10:28 AM  Video End Time (time video stopped): 11:33 AM    How would patient like to obtain AVS? Mail a copy   Visit Type:Initial assessment and intervention  Magui Avalos presents today for MNT and education related to prediabetes.   She is accompanied by self.     ASSESSMENT:   Patient comments/concerns relating to nutrition:   Magui has made significant changes since learning her A1c was elevated. Fasting for 12 hours. No snacking at night.  Walking 30-40 minutes per day  Increased water intake (9 cups per day)  Changed to Whole grain carbohydrates and decreased starch portions in half.  Eating a lot of organic vegetables.  Significantly decreased intake of sweets.   Lost 10 pounds in the past month.  NUTRITION HISTORY:  Eating small amounts every 2 hours.  Breakfast: low fat yogurt or low sugar peanut butter with organic cinnamon, melania  seeds, flax seeds and organic walnuts. Water and organic coffee.  Lunch: 1/2 cup whole grain pasta or brown rice or potatoes with raw veggies, cherry tomatoes or greens (kale) and tofu or low fat cottage cheese or salad with melania seeds. 2 dates or 1 oz dark chocolate for her daily \"treat\".  40 minute walk after lunch.  Dinner: similar to lunch meal but without the dates or dark chocolate.  Snacks: organic unsalted nuts, whole wheat bread, cherry tomatoes, berries  Beverages:   Green " "tea, coffee, water  Misses meals? no    Previous diet education:  No     Food allergies/intolerances: none    SOCIO/ECONOMIC:   Lives with: self and spouse. They eat differently and cook for themselves    MEDICATIONS:  Current Outpatient Medications   Medication Sig Dispense Refill     Calcium Carbonate-Vitamin D 250-3.125 MG-MCG TABS Take  by mouth two times a day.       clobetasol (TEMOVATE) 0.05 % external ointment Apply topically at bedtime For 3 months, then reduce to 3 times weekly and continue to reduce to what keeps symptoms at bay 1-2 time weekly 60 g 11     No current facility-administered medications for this visit.       LABS:  No results found for: \"NA\"   No results found for: \"POTASSIUM\"  No results found for: \"CHLORIDE\"  No results found for: \"DIPAK\"  No results found for: \"CO2\"  No results found for: \"BUN\"  No results found for: \"CR\"  No results found for: \"GLC\"  Lab Results   Component Value Date     04/02/2024     Direct Measure HDL   Date Value Ref Range Status   04/02/2024 80 >=50 mg/dL Final   ]  No results found for: \"GFRESTIMATED\"  No results found for: \"CR\"  No results found for: \"MICROALBUMIN\"    ANTHROPOMETRICS:  Vitals:   Height: 4'10.58\"  BMI: 23.1 (4/2/24)  BMI: 20.5 currently using home weight of 100 pounds    Wt Readings from Last 5 Encounters:   04/02/24 51.3 kg (113 lb)     Weight Change: decrease of 10 pounds    NUTRITION DIAGNOSIS: Prediabetes related to excess carbohydrate intake as evidenced by A1c of 6%.    NUTRITION INTERVENTION:  Education Materials Provided: My Plate Planner/Choose My Plate, Label Reading, 100 Calorie Snacks, Heart Healthy Food Choices, and Fiber Facts    PATIENT'S BEHAVIOR CHANGE GOALS:   See Patient Instructions for patient stated behavior change goals. AVS was printed and given to patient at today's appointment.    MONITOR / EVALUATE:  RD will monitor/evaluate:  Blood Glucose / A1c  Food and nutrition knowledge / skills  Readiness to change " nutrition-related behaviors  Weight change    FOLLOW-UP:  Call or email RD with questions/concerns.     Anne-Marie Tinsley RD, LD, CDCES   Certified Diabetes Care and   [facility] Tuesdays and Thursdays  [facility] Wednesdays-virtual visits only  Time spent in minutes: 60  Encounter: Individual

## 2024-05-01 NOTE — PATIENT INSTRUCTIONS
To help prevent type 2 diabetes:  Aim for a 10% weight loss. You already lost 10 pounds.   Continue walking  30 minutes most days of the week.  Continue to eat smaller amounts more often.  Limit added sugar to 24 gm or less per day.  Include protein at meals and snacks.   Feel free to send me an email with any questions or concerns.     Review the following eating patterns and think about which one might be the best fit for you.     Healthy Eating Patterns for Weight Management  My Plate  Plate planning provides an easy way to create balanced meals based on foods you like to eat. Fill one quarter of your plate with non-starchy vegetables, one quarter with fruit, one quarter with lean protein, and the last quarter with fiber-rich starch or grain. Add a serving of calcium-rich dairy or non-dairy substitute to complete the balanced meal. You may add a small amount of fat to your meal, if desired (such as 1 tablespoon salad dressing or 1 teaspoon butter or margarine). If you choose to snack, keep snacks small. https://www.choosemyplate.gov/   Customized Meal Plan  Work with your registered dietitian to determine how many servings from each food group to have at each of your meals and snacks. This allows you to customize your meals, while ensuring you are getting enough from each food group throughout the day. https://www.eatright.org/   Calorie Counting  Work with your registered dietitian to determine how many calories to aim to eat each day. Use food labels and smartphone apps to help you tally up calories. Keep in mind that higher quality, less processed foods (whole grains, fruits, vegetables, lean proteins, heart healthy fats, and low fat dairy) provide better nutrition for fewer calories than packaged and processed foods.  https://www.mayoclinic.org/healthy-lifestyle/weight-loss/in-depth/calories/art-46370695?pg=1   Carbohydrate Counting: Aim for 45 grams of carbohydrate at meals and 0-30 gm carbohydrate per snack  to maintain current weight.  Work with your registered dietitian to determine how many carbohydrate (starch and sugar) food choices to have at meals and snacks. Often used as a meal planning tool for people with diabetes, it can also help with weight loss and diabetes prevention. Carbohydrates that are high in fiber are best - fruits, vegetables, legumes and whole grains, Keep in mind that portion control for proteins and fats is important to avoid overeating. http://www.fvfiles.com/216493.pdf   Mediterranean  The Mediterranean eating plan focuses on plant-based eating, incorporating in-season fruits and vegetables, whole grains, beans and legumes, and healthy fats like olive oil and nuts, while minimizing animal proteins. Fish and seafood are recommended two times a week or more, cheese, yogurt, eggs and poultry can be enjoyed daily or weekly in moderate portions, and red meats and sweets are eaten less often. Fruit is often enjoyed for dessert.  http://www.Pictrition App.LeukoDx/   Higher Protein/Lower Carbohydrate  A higher protein, lower carbohydrate pattern keeps carbohydrate intake at the recommended dietary allowance minimum of 130 grams per day and encourages including lean protein at each meal to help increase fullness after a meal. Best carbohydrate sources are fiber-rich fruits, vegetables, legumes and whole grains. Work with your registered dietitian to determine how much protein is recommended for you.  https://www.hsp.[employer].edu/nutritionsource/what-should-you-eat/protein/

## 2024-07-18 ENCOUNTER — OFFICE VISIT (OUTPATIENT)
Dept: DERMATOLOGY | Facility: CLINIC | Age: 69
End: 2024-07-18
Payer: COMMERCIAL

## 2024-07-18 DIAGNOSIS — L82.1 SEBORRHEIC KERATOSIS: ICD-10-CM

## 2024-07-18 DIAGNOSIS — D22.9 NEVUS: Primary | ICD-10-CM

## 2024-07-18 DIAGNOSIS — D18.01 ANGIOMA OF SKIN: ICD-10-CM

## 2024-07-18 DIAGNOSIS — L81.4 LENTIGO: ICD-10-CM

## 2024-07-18 PROCEDURE — 99203 OFFICE O/P NEW LOW 30 MIN: CPT | Performed by: PHYSICIAN ASSISTANT

## 2024-07-18 ASSESSMENT — PAIN SCALES - GENERAL: PAINLEVEL: NO PAIN (0)
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
## 2024-07-18 NOTE — LETTER
7/18/2024      Magui Avalos  [address] [facility] [address]      Dear Colleague,    Thank you for referring your patient, Magui Avalos, to the [facility]. Please see a copy of my visit note below.    HPI:   Chief complaints: Magui Avalos is a pleasant 68 year old female who presents for Full skin cancer screening to rule out skin cancer   Last Skin Exam: 2018 1st Baseline: yes  Personal HX of Skin Cancer: no   Personal HX of Malignant Melanoma: no   Family HX of Skin Cancer / Malignant Melanoma: no  Personal HX of Atypical Moles:   no  Risk factors: history of sun exposure and burns  New / Changing lesions:none  Social History:   On review of systems, there are no further skin complaints, patient is feeling otherwise well.   ROS of the following were done and are negative: Constitutional, Eyes, Ears, Nose,   Mouth, Throat, Cardiovascular, Respiratory, GI, Genitourinary, Musculoskeletal,   Psychiatric, Endocrine, Allergic/Immunologic.    PHYSICAL EXAM:   There were no vitals taken for this visit.  Skin exam performed as follows: Type 2 skin. Mood appropriate  Alert and Oriented X 3. Well developed, well nourished in no distress.  General appearance: Normal  Head including face: Normal  Eyes: conjunctiva and lids: Normal  Mouth: Lips, teeth, gums: Normal  Neck: Normal  Chest-breast/axillae: Normal  Back: Normal  Extremities: digits/nails (clubbing): Normal  Eccrine and Apocrine glands: Normal  Right upper extremity: Normal  Left upper extremity: Normal  Right lower extremity: Normal  Left lower extremity: Normal  Skin: Scalp and body hair: See below    Pt deferred exam of breasts, groin, buttocks: No    Other physical findings:  1. Multiple pigmented macules on extremities and trunk  2. Multiple pigmented macules on face, trunk and extremities  3. Multiple vascular papules on trunk, arms and legs  4. Multiple scattered keratotic plaques       Except as noted above, no  other signs of skin cancer or melanoma.     ASSESSMENT/PLAN:   Benign Full skin cancer screening today. . Patient with history of none  Advised on monthly self exams and 1 year  Patient Education: Appropriate brochures given.    Multiple benign appearing melanocytic nevi on arms, legs and trunk. Discussed ABCDEs of melanoma and sunscreen.   Multiple lentigos on arms, legs and trunk. Advised benign, no treatment needed.  Multiple scattered angiomas. Advised benign, no treatment needed.   Seborrheic keratosis on arms, legs and trunk. Advised benign, no treatment needed.          Follow-up: FSE every 3-4 years/PRN sooner    1.) Patient was asked about new and changing moles. YES  2.) Patient received a complete physical skin examination: YES  3.) Patient was counseled to perform a monthly self skin examination: YES  Scribed By: Kathryn Alfonso, MS, PAJANUARY      Again, thank you for allowing me to participate in the care of your patient.        Sincerely,        Kathryn Alfonso PA-C

## 2024-07-18 NOTE — PROGRESS NOTES
HPI:   Chief complaints: Magui Avalos is a pleasant 68 year old female who presents for Full skin cancer screening to rule out skin cancer   Last Skin Exam: 2018 1st Baseline: yes  Personal HX of Skin Cancer: no   Personal HX of Malignant Melanoma: no   Family HX of Skin Cancer / Malignant Melanoma: no  Personal HX of Atypical Moles:   no  Risk factors: history of sun exposure and burns  New / Changing lesions:none  Social History:   On review of systems, there are no further skin complaints, patient is feeling otherwise well.   ROS of the following were done and are negative: Constitutional, Eyes, Ears, Nose,   Mouth, Throat, Cardiovascular, Respiratory, GI, Genitourinary, Musculoskeletal,   Psychiatric, Endocrine, Allergic/Immunologic.    PHYSICAL EXAM:   There were no vitals taken for this visit.  Skin exam performed as follows: Type 2 skin. Mood appropriate  Alert and Oriented X 3. Well developed, well nourished in no distress.  General appearance: Normal  Head including face: Normal  Eyes: conjunctiva and lids: Normal  Mouth: Lips, teeth, gums: Normal  Neck: Normal  Chest-breast/axillae: Normal  Back: Normal  Extremities: digits/nails (clubbing): Normal  Eccrine and Apocrine glands: Normal  Right upper extremity: Normal  Left upper extremity: Normal  Right lower extremity: Normal  Left lower extremity: Normal  Skin: Scalp and body hair: See below    Pt deferred exam of breasts, groin, buttocks: No    Other physical findings:  1. Multiple pigmented macules on extremities and trunk  2. Multiple pigmented macules on face, trunk and extremities  3. Multiple vascular papules on trunk, arms and legs  4. Multiple scattered keratotic plaques       Except as noted above, no other signs of skin cancer or melanoma.     ASSESSMENT/PLAN:   Benign Full skin cancer screening today. . Patient with history of none  Advised on monthly self exams and 1 year  Patient Education: Appropriate brochures given.    Multiple  benign appearing melanocytic nevi on arms, legs and trunk. Discussed ABCDEs of melanoma and sunscreen.   Multiple lentigos on arms, legs and trunk. Advised benign, no treatment needed.  Multiple scattered angiomas. Advised benign, no treatment needed.   Seborrheic keratosis on arms, legs and trunk. Advised benign, no treatment needed.          Follow-up: FSE every 3-4 years/PRN sooner    1.) Patient was asked about new and changing moles. YES  2.) Patient received a complete physical skin examination: YES  3.) Patient was counseled to perform a monthly self skin examination: YES  Scribed By: Kathryn Alfonso, MS, PA-C

## 2024-07-23 ENCOUNTER — OFFICE VISIT (OUTPATIENT)
Dept: DERMATOLOGY | Facility: CLINIC | Age: 69
End: 2024-07-23
Payer: COMMERCIAL

## 2024-07-23 DIAGNOSIS — D18.01 ANGIOMA OF SKIN: ICD-10-CM

## 2024-07-23 DIAGNOSIS — L81.4 LENTIGO: ICD-10-CM

## 2024-07-23 DIAGNOSIS — L82.1 SEBORRHEIC KERATOSIS: Primary | ICD-10-CM

## 2024-07-23 PROCEDURE — 99213 OFFICE O/P EST LOW 20 MIN: CPT | Performed by: PHYSICIAN ASSISTANT

## 2024-07-23 ASSESSMENT — PAIN SCALES - GENERAL: PAINLEVEL: NO PAIN (0)

## 2024-07-23 NOTE — LETTER
7/23/2024      Magui Avalos  [address] [facility] [address]      Dear Colleague,    Thank you for referring your patient, Magui Avalos, to the [facility]. Please see a copy of my visit note below.    Magui Avalos is a pleasant 68 year old year old female patient here today for check chest. She recently had skin check but at that time deferred evaluation of chest. She notes now she would like evaluation. No painful or bleeding skin lesions. No changing nevi. Patient has no other skin complaints today.  Remainder of the HPI, Meds, PMH, Allergies, FH, and SH was reviewed in chart.    Pertinent Hx:   No personal history of skin cancer.   History reviewed. No pertinent past medical history.    Past Surgical History:   Procedure Laterality Date     right shoulder arthroscopy          Family History   Problem Relation Age of Onset     Hypertension Mother      Prostate Cancer Father      Brain Tumor Father        Social History     Socioeconomic History     Marital status:      Spouse name: Not on file     Number of children: Not on file     Years of education: Not on file     Highest education level: Not on file   Occupational History     Not on file   Tobacco Use     Smoking status: Never     Smokeless tobacco: Never   Vaping Use     Vaping status: Never Used   Substance and Sexual Activity     Alcohol use: Never     Drug use: Never     Sexual activity: Not Currently     Partners: Male   Other Topics Concern     Not on file   Social History Narrative     Not on file     Social Determinants of Health     Financial Resource Strain: Low Risk  (4/2/2024)    Financial Resource Strain      Within the past 12 months, have you or your family members you live with been unable to get utilities (heat, electricity) when it was really needed?: No   Food Insecurity: Low Risk  (4/2/2024)    Food Insecurity      Within the past 12 months, did you worry that your food would run out before  you got money to buy more?: No      Within the past 12 months, did the food you bought just not last and you didn t have money to get more?: No   Transportation Needs: Low Risk  (4/2/2024)    Transportation Needs      Within the past 12 months, has lack of transportation kept you from medical appointments, getting your medicines, non-medical meetings or appointments, work, or from getting things that you need?: No   Physical Activity: Unknown (4/2/2024)    Exercise Vital Sign      Days of Exercise per Week: 2 days      Minutes of Exercise per Session: Not on file   Stress: Stress Concern Present (4/2/2024)    [employer] - Occupational Stress Questionnaire      Feeling of Stress : To some extent   Social Connections: Unknown (4/2/2024)    Social Connection and Isolation Panel [NHANES]      Frequency of Communication with Friends and Family: Not on file      Frequency of Social Gatherings with Friends and Family: More than three times a week      Attends Jainism Services: Not on file      Active Member of Clubs or Organizations: Not on file      Attends Club or Organization Meetings: Not on file      Marital Status: Not on file   Interpersonal Safety: Low Risk  (4/2/2024)    Interpersonal Safety      Do you feel physically and emotionally safe where you currently live?: Yes      Within the past 12 months, have you been hit, slapped, kicked or otherwise physically hurt by someone?: No      Within the past 12 months, have you been humiliated or emotionally abused in other ways by your partner or ex-partner?: No   Housing Stability: Low Risk  (4/2/2024)    Housing Stability      Do you have housing? : Yes      Are you worried about losing your housing?: No       Outpatient Encounter Medications as of 7/23/2024   Medication Sig Dispense Refill     Calcium Carbonate-Vitamin D 250-3.125 MG-MCG TABS Take  by mouth two times a day.       clobetasol (TEMOVATE) 0.05 % external ointment Apply  topically at bedtime For 3 months, then reduce to 3 times weekly and continue to reduce to what keeps symptoms at bay 1-2 time weekly (Patient not taking: Reported on 7/18/2024) 60 g 11     No facility-administered encounter medications on file as of 7/23/2024.             O:   NAD, WDWN, Alert & Oriented, Mood & Affect wnl, Vitals stable   Here today alone   There were no vitals taken for this visit.   General appearance normal   Vitals stable   Alert, oriented and in no acute distress        Stuck on papules and brown macules on chest  Red papules on chest      Eyes: Conjunctivae/lids:Normal     ENT: Lips normal    MSK:Normal    Pulm: Breathing Normal    Neuro/Psych: Orientation:Alert and Orientedx3 ; Mood/Affect:normal     A/P:  1. Seborrheic keratosis, lentigo, angioma  It was a pleasure speaking to Magui Avalos today.  BENIGN LESIONS DISCUSSED WITH PATIENT:  I discussed the specifics of tumor, prognosis, and genetics of benign lesions.  I explained that treatment of these lesions would be purely cosmetic and not medically neccessary.  I discussed with patient different removal options including excision, cautery and /or laser.      Nature and genetics of benign skin lesions dicussed with patient.  Signs and Symptoms of skin cancer discussed with patient.  ABCDEs of melanoma reviewed with patient.  Patient encouraged to perform monthly skin exams.  UV precautions reviewed with patient.  Risks of non-melanoma skin cancer discussed with patient   Return to clinic as needed.       Again, thank you for allowing me to participate in the care of your patient.        Sincerely,        Ann Marie Wright PA-C

## 2024-07-23 NOTE — NURSING NOTE
Chief Complaint   Patient presents with    Derm Problem     Recheck chest area, was previously declined        There were no vitals filed for this visit.  Wt Readings from Last 1 Encounters:   04/02/24 51.3 kg (113 lb)       Samantha Barba LPN .................7/23/2024

## 2024-07-24 NOTE — PROGRESS NOTES
Magui Avalos is a pleasant 68 year old year old female patient here today for check chest. She recently had skin check but at that time deferred evaluation of chest. She notes now she would like evaluation. No painful or bleeding skin lesions. No changing nevi. Patient has no other skin complaints today.  Remainder of the HPI, Meds, PMH, Allergies, FH, and SH was reviewed in chart.    Pertinent Hx:   No personal history of skin cancer.   History reviewed. No pertinent past medical history.    Past Surgical History:   Procedure Laterality Date    right shoulder arthroscopy          Family History   Problem Relation Age of Onset    Hypertension Mother     Prostate Cancer Father     Brain Tumor Father        Social History     Socioeconomic History    Marital status:      Spouse name: Not on file    Number of children: Not on file    Years of education: Not on file    Highest education level: Not on file   Occupational History    Not on file   Tobacco Use    Smoking status: Never    Smokeless tobacco: Never   Vaping Use    Vaping status: Never Used   Substance and Sexual Activity    Alcohol use: Never    Drug use: Never    Sexual activity: Not Currently     Partners: Male   Other Topics Concern    Not on file   Social History Narrative    Not on file     Social Determinants of Health     Financial Resource Strain: Low Risk  (4/2/2024)    Financial Resource Strain     Within the past 12 months, have you or your family members you live with been unable to get utilities (heat, electricity) when it was really needed?: No   Food Insecurity: Low Risk  (4/2/2024)    Food Insecurity     Within the past 12 months, did you worry that your food would run out before you got money to buy more?: No     Within the past 12 months, did the food you bought just not last and you didn t have money to get more?: No   Transportation Needs: Low Risk  (4/2/2024)    Transportation Needs     Within the past 12 months, has lack of  transportation kept you from medical appointments, getting your medicines, non-medical meetings or appointments, work, or from getting things that you need?: No   Physical Activity: Unknown (4/2/2024)    Exercise Vital Sign     Days of Exercise per Week: 2 days     Minutes of Exercise per Session: Not on file   Stress: Stress Concern Present (4/2/2024)    [employer] - Occupational Stress Questionnaire     Feeling of Stress : To some extent   Social Connections: Unknown (4/2/2024)    Social Connection and Isolation Panel [NHANES]     Frequency of Communication with Friends and Family: Not on file     Frequency of Social Gatherings with Friends and Family: More than three times a week     Attends Islam Services: Not on file     Active Member of Clubs or Organizations: Not on file     Attends Club or Organization Meetings: Not on file     Marital Status: Not on file   Interpersonal Safety: Low Risk  (4/2/2024)    Interpersonal Safety     Do you feel physically and emotionally safe where you currently live?: Yes     Within the past 12 months, have you been hit, slapped, kicked or otherwise physically hurt by someone?: No     Within the past 12 months, have you been humiliated or emotionally abused in other ways by your partner or ex-partner?: No   Housing Stability: Low Risk  (4/2/2024)    Housing Stability     Do you have housing? : Yes     Are you worried about losing your housing?: No       Outpatient Encounter Medications as of 7/23/2024   Medication Sig Dispense Refill    Calcium Carbonate-Vitamin D 250-3.125 MG-MCG TABS Take  by mouth two times a day.      clobetasol (TEMOVATE) 0.05 % external ointment Apply topically at bedtime For 3 months, then reduce to 3 times weekly and continue to reduce to what keeps symptoms at bay 1-2 time weekly (Patient not taking: Reported on 7/18/2024) 60 g 11     No facility-administered encounter medications on file as of 7/23/2024.             O:    NAD, WDWN, Alert & Oriented, Mood & Affect wnl, Vitals stable   Here today alone   There were no vitals taken for this visit.   General appearance normal   Vitals stable   Alert, oriented and in no acute distress        Stuck on papules and brown macules on chest  Red papules on chest      Eyes: Conjunctivae/lids:Normal     ENT: Lips normal    MSK:Normal    Pulm: Breathing Normal    Neuro/Psych: Orientation:Alert and Orientedx3 ; Mood/Affect:normal     A/P:  1. Seborrheic keratosis, lentigo, angioma  It was a pleasure speaking to Magui Avalos today.  BENIGN LESIONS DISCUSSED WITH PATIENT:  I discussed the specifics of tumor, prognosis, and genetics of benign lesions.  I explained that treatment of these lesions would be purely cosmetic and not medically neccessary.  I discussed with patient different removal options including excision, cautery and /or laser.      Nature and genetics of benign skin lesions dicussed with patient.  Signs and Symptoms of skin cancer discussed with patient.  ABCDEs of melanoma reviewed with patient.  Patient encouraged to perform monthly skin exams.  UV precautions reviewed with patient.  Risks of non-melanoma skin cancer discussed with patient   Return to clinic as needed.

## 2024-07-25 ENCOUNTER — TELEPHONE (OUTPATIENT)
Dept: FAMILY MEDICINE | Facility: CLINIC | Age: 69
End: 2024-07-25
Payer: COMMERCIAL

## 2024-07-25 DIAGNOSIS — E78.5 HYPERLIPIDEMIA LDL GOAL <100: ICD-10-CM

## 2024-07-25 DIAGNOSIS — R73.03 PREDIABETES: Primary | ICD-10-CM

## 2024-07-25 NOTE — TELEPHONE ENCOUNTER
See message below, lab orders pended for PCP review. Last labs done on 4/2/24.    Kristie Partida RN  [facility]

## 2024-07-25 NOTE — TELEPHONE ENCOUNTER
Order/Referral Request    Who is requesting: patient    Orders being requested: lab     Reason service is needed/diagnosis: Glucose and cholesterol was slightly high last time.  Would like lab orders to recheck her A1C and chol.      When are orders needed by: asap    Has this been discussed with Provider: Yes    Does patient have a preference on a Group/Provider/Facility? [facility]    Does patient have an appointment scheduled?: No    Where to send orders: Place orders within Epic    Okay to leave a detailed message?: Yes at Cell number on file:    Telephone Information:   Mobile [phone]    Sanjana Glover on 7/25/2024 at 2:17 PM

## 2024-08-01 ENCOUNTER — LAB (OUTPATIENT)
Dept: LAB | Facility: CLINIC | Age: 69
End: 2024-08-01
Payer: COMMERCIAL

## 2024-08-01 DIAGNOSIS — E78.5 HYPERLIPIDEMIA LDL GOAL <100: ICD-10-CM

## 2024-08-01 DIAGNOSIS — R73.03 PREDIABETES: ICD-10-CM

## 2024-08-01 LAB
CHOLEST SERPL-MCNC: 168 MG/DL
FASTING STATUS PATIENT QL REPORTED: YES
HBA1C MFR BLD: 5.7 % (ref 0–5.6)
HDLC SERPL-MCNC: 78 MG/DL
LDLC SERPL CALC-MCNC: 74 MG/DL
NONHDLC SERPL-MCNC: 90 MG/DL
TRIGL SERPL-MCNC: 80 MG/DL

## 2024-08-01 PROCEDURE — 36415 COLL VENOUS BLD VENIPUNCTURE: CPT

## 2024-08-01 PROCEDURE — 83036 HEMOGLOBIN GLYCOSYLATED A1C: CPT

## 2024-08-01 PROCEDURE — 80061 LIPID PANEL: CPT

## 2025-03-12 PROBLEM — Z91.89 DIETHYLSTILBESTROL (DES) EXPOSURE AS FETUS: Status: ACTIVE | Noted: 2024-04-02

## 2025-05-27 ENCOUNTER — RESULTS FOLLOW-UP (OUTPATIENT)
Dept: FAMILY MEDICINE | Facility: CLINIC | Age: 70
End: 2025-05-27

## 2025-05-27 ENCOUNTER — OFFICE VISIT (OUTPATIENT)
Dept: FAMILY MEDICINE | Facility: CLINIC | Age: 70
End: 2025-05-27
Payer: COMMERCIAL

## 2025-05-27 VITALS
HEART RATE: 100 BPM | BODY MASS INDEX: 19.76 KG/M2 | HEIGHT: 59 IN | DIASTOLIC BLOOD PRESSURE: 72 MMHG | WEIGHT: 98 LBS | OXYGEN SATURATION: 99 % | RESPIRATION RATE: 14 BRPM | TEMPERATURE: 97.3 F | SYSTOLIC BLOOD PRESSURE: 132 MMHG

## 2025-05-27 DIAGNOSIS — Z00.00 ENCOUNTER FOR MEDICARE ANNUAL WELLNESS EXAM: Primary | ICD-10-CM

## 2025-05-27 DIAGNOSIS — S29.011D CHEST WALL MUSCLE STRAIN, SUBSEQUENT ENCOUNTER: ICD-10-CM

## 2025-05-27 DIAGNOSIS — N90.4 LICHEN SCLEROSUS OF VULVA: ICD-10-CM

## 2025-05-27 DIAGNOSIS — R73.03 PREDIABETES: ICD-10-CM

## 2025-05-27 DIAGNOSIS — S46.912D LEFT SHOULDER STRAIN, SUBSEQUENT ENCOUNTER: ICD-10-CM

## 2025-05-27 PROBLEM — L90.0 LICHEN SCLEROSUS: Status: ACTIVE | Noted: 2025-05-27

## 2025-05-27 LAB
EST. AVERAGE GLUCOSE BLD GHB EST-MCNC: 114 MG/DL
HBA1C MFR BLD: 5.6 % (ref 0–5.6)

## 2025-05-27 PROCEDURE — 3078F DIAST BP <80 MM HG: CPT | Performed by: NURSE PRACTITIONER

## 2025-05-27 PROCEDURE — 3075F SYST BP GE 130 - 139MM HG: CPT | Performed by: NURSE PRACTITIONER

## 2025-05-27 PROCEDURE — G0439 PPPS, SUBSEQ VISIT: HCPCS | Performed by: NURSE PRACTITIONER

## 2025-05-27 PROCEDURE — 83036 HEMOGLOBIN GLYCOSYLATED A1C: CPT | Performed by: NURSE PRACTITIONER

## 2025-05-27 PROCEDURE — 3044F HG A1C LEVEL LT 7.0%: CPT | Performed by: NURSE PRACTITIONER

## 2025-05-27 PROCEDURE — 36415 COLL VENOUS BLD VENIPUNCTURE: CPT | Performed by: NURSE PRACTITIONER

## 2025-05-27 PROCEDURE — 99213 OFFICE O/P EST LOW 20 MIN: CPT | Mod: 25 | Performed by: NURSE PRACTITIONER

## 2025-05-27 PROCEDURE — 1126F AMNT PAIN NOTED NONE PRSNT: CPT | Performed by: NURSE PRACTITIONER

## 2025-05-27 SDOH — HEALTH STABILITY: PHYSICAL HEALTH: ON AVERAGE, HOW MANY DAYS PER WEEK DO YOU ENGAGE IN MODERATE TO STRENUOUS EXERCISE (LIKE A BRISK WALK)?: 6 DAYS

## 2025-05-27 ASSESSMENT — PAIN SCALES - GENERAL: PAINLEVEL_OUTOF10: NO PAIN (0)

## 2025-05-27 ASSESSMENT — SOCIAL DETERMINANTS OF HEALTH (SDOH): HOW OFTEN DO YOU GET TOGETHER WITH FRIENDS OR RELATIVES?: MORE THAN THREE TIMES A WEEK

## 2025-05-27 NOTE — PROGRESS NOTES
Preventive Care Visit  [facility]  Marnie Mitchell NP, Family Medicine  May 27, 2025    Assessment & Plan     Encounter for Medicare annual wellness exam  Patient does have history of GAY exposure but has had normal Paps for many years yearly.  Will plan to skip until next year for repeat Pap at this time.  A1c ordered due to history of prediabetes today.  No other screenings due at this time.  Reviewed preventive health recommendations and advised follow-up in 1 year for annual physical exam.  - PRIMARY CARE FOLLOW-UP SCHEDULING; Future  - REVIEW OF HEALTH MAINTENANCE PROTOCOL ORDERS    Prediabetes  - Hemoglobin A1c; Future  - Hemoglobin A1c    Lichen sclerosus of vulva  Advised patient that she can use her clobetasol twice daily to get symptoms under control over 12 weeks and then twice weekly maintenance.    Left shoulder strain, subsequent encounter  Patient's exam is on the left shoulder and chest wall and likely this is more muscle strain.  Advised rest, Tylenol/ibuprofen, ice or heat and order for physical therapy to evaluate and treat for strengthening the muscles since she is very active.  Follow-up as needed.  - Physical Therapy  Referral; Future    Chest wall muscle strain, subsequent encounter  See note above.  - Physical Therapy  Referral; Future    Patient has been advised of split billing requirements and indicates understanding: Yes        Counseling  Appropriate preventive services were addressed with this patient via screening, questionnaire, or discussion as appropriate for fall prevention, nutrition, physical activity, Tobacco-use cessation, social engagement, weight loss and cognition.  Checklist reviewing preventive services available has been given to the patient.  Reviewed patient's diet, addressing concerns and/or questions.   The patient was instructed to see the dentist every 6 months.   She is at risk for psychosocial distress and has been  provided with information to reduce risk.       Follow-up    Follow-up Visit   Expected date:  Jun 03, 2026 (Approximate)      Follow Up Appointment Details:     Follow-up with whom?: PCP    Follow-Up for what?: Medicare Wellness    Welcome or Annual?: Annual Wellness    How?: In Person                 Subjective   Magui is a 69 year old, presenting for the following:  Physical        5/27/2025     9:53 AM   Additional Questions   Roomed by rmb   Accompanied by self         5/27/2025     9:53 AM   Patient Reported Additional Medications   Patient reports taking the following new medications none     HPI    Advance Care Planning    Patient states has Health Care Directive and will send to [employer].        5/27/2025   General Health   How would you rate your overall physical health? Excellent   Feel stress (tense, anxious, or unable to sleep) To some extent   (!) STRESS CONCERN      5/27/2025   Nutrition   Diet: Other   If other, please elaborate: prediabetic         5/27/2025   Exercise   Days per week of moderate/strenous exercise 6 days         5/27/2025   Social Factors   Frequency of gathering with friends or relatives More than three times a week   Worry food won't last until get money to buy more No   Food not last or not have enough money for food? No   Do you have housing? (Housing is defined as stable permanent housing and does not include staying outside in a car, in a tent, in an abandoned building, in an overnight shelter, or couch-surfing.) Yes   Are you worried about losing your housing? No   Lack of transportation? No   Unable to get utilities (heat,electricity)? No         5/27/2025   Fall Risk   Fallen 2 or more times in the past year? Yes   Trouble with walking or balance? No   Gait Speed Test (Document in seconds) 3.12   Gait Speed Test Interpretation Less than or equal to 5.00 seconds - PASS         5/27/2025   Activities of Daily Living- Home Safety   Needs help with the following daily  activites None of the above   Safety concerns in the home None of the above         5/27/2025   Dental   Dentist two times every year? (!) NO   Does go once a year.        5/27/2025   Hearing Screening   Hearing concerns? None of the above         5/27/2025   Driving Risk Screening   Patient/family members have concerns about driving No         5/27/2025   General Alertness/Fatigue Screening   Have you been more tired than usual lately? No         5/27/2025   Urinary Incontinence Screening   Bothered by leaking urine in past 6 months No         Today's PHQ-2 Score:       5/27/2025     9:30 AM   PHQ-2 ( 1999 Pf[employer])   Q1: Little interest or pleasure in doing things 0   Q2: Feeling down, depressed or hopeless 0   PHQ-2 Score 0    Q1: Little interest or pleasure in doing things Not at all   Q2: Feeling down, depressed or hopeless Not at all   PHQ-2 Score 0       Patient-reported           5/27/2025   Substance Use   Alcohol more than 3/day or more than 7/wk No   Do you have a current opioid prescription? No   How severe/bad is pain from 1 to 10? 1/10   Do you use any other substances recreationally? No     Social History     Tobacco Use    Smoking status: Never    Smokeless tobacco: Never   Vaping Use    Vaping status: Never Used   Substance Use Topics    Alcohol use: Never    Drug use: Never         4/10/2024   LAST FHS-7 RESULTS   1st degree relative breast or ovarian cancer No   Any relative bilateral breast cancer No   Any male have breast cancer No   Any ONE woman have BOTH breast AND ovarian cancer No   Any woman with breast cancer before 50yrs No   2 or more relatives with breast AND/OR ovarian cancer No   2 or more relatives with breast AND/OR bowel cancer No     Mammogram Screening - Mammogram every 1-2 years updated in Health Maintenance based on mutual decision making      History of abnormal Pap smear: Hx of GAY mom and will change to every other year.        Latest Ref Rng & Units 4/2/2024    11:06 AM    PAP / HPV   PAP  Negative for Intraepithelial Lesion or Malignancy (NILM)    HPV 16 DNA Negative Negative    HPV 18 DNA Negative Negative    Other HR HPV Negative Negative      ASCVD Risk   The 10-year ASCVD risk score (Aidee RENNER, et al., 2019) is: 6.9%    Values used to calculate the score:      Age: 69 years      Sex: Female      Is Non- : No      Diabetic: No      Tobacco smoker: No      Systolic Blood Pressure: 132 mmHg      Is BP treated: No      HDL Cholesterol: 78 mg/dL      Total Cholesterol: 168 mg/dL    Reviewed and updated as needed this visit by Provider   Tobacco  Allergies  Meds  Problems  Med Hx  Surg Hx  Fam Hx  Soc   Hx Sexual Activity          History reviewed. No pertinent past medical history.  Past Surgical History:   Procedure Laterality Date    right shoulder arthroscopy       Labs reviewed in EPIC  BP Readings from Last 3 Encounters:   05/27/25 132/72   04/02/24 124/78    Wt Readings from Last 3 Encounters:   05/27/25 44.5 kg (98 lb)   04/02/24 51.3 kg (113 lb)                  Patient Active Problem List   Diagnosis    Seasonal allergic rhinitis due to fungal spores    Diethylstilbestrol (GAY) exposure as fetus    Prediabetes    Lichen sclerosus     Past Surgical History:   Procedure Laterality Date    right shoulder arthroscopy         Social History     Tobacco Use    Smoking status: Never    Smokeless tobacco: Never   Substance Use Topics    Alcohol use: Never     Family History   Problem Relation Age of Onset    Hypertension Mother     Prostate Cancer Father     Brain Tumor Father          Current Outpatient Medications   Medication Sig Dispense Refill    Calcium Carbonate-Vitamin D 250-3.125 MG-MCG TABS Take  by mouth two times a day.      clobetasol (TEMOVATE) 0.05 % external ointment Apply topically at bedtime For 3 months, then reduce to 3 times weekly and continue to reduce to what keeps symptoms at bay 1-2 time weekly (Patient not taking:  Reported on 5/27/2025) 60 g 11     Allergies   Allergen Reactions    Dust Mite Extract Unknown     Stuffy nose    Other reaction(s): *Unknown    Kiwi Extract      Lips swell    Latex Other (See Comments)     Other reaction(s): *Unknown   Sensitivity due to Kiwi allergy    Sensitivity due to Kiwi allergy    Mold Unknown     Other reaction(s): *Unknown   Stuffy nose    Molds & Smuts      Stuffy nose    Nsaids      GI upset    Other Environmental Allergy      kiwi, dust, molds, aerosol sprays    Other Food Allergy      kiwi, dust, molds, aerosol sprays     Recent Labs   Lab Test 08/01/24  0855 04/02/24  1217 04/02/24  1216   A1C 5.7* 6.0*  --    LDL 74  --  147*   HDL 78  --  80   TRIG 80  --  106      Current providers sharing in care for this patient include:  Patient Care Team:  Marnie Mitchell NP as PCP - General (Family Medicine)  Kathryn Alfonso PA-C as Physician Assistant (Dermatology)  Marnie Mitchell NP as Assigned PCP  Ann Marie Salazar PA-C as Assigned Dermatology Provider    The following health maintenance items are reviewed in Epic and correct as of today:  Health Maintenance   Topic Date Due    HEPATITIS C SCREENING  Never done    COVID-19 Vaccine (8 - 2024-25 season) 03/16/2025    PAP FOLLOW-UP  04/02/2025    HPV FOLLOW-UP  04/02/2025    DTAP/TDAP/TD IMMUNIZATION (3 - Td or Tdap) 06/19/2025    MAMMO SCREENING  04/29/2026    MEDICARE ANNUAL WELLNESS VISIT  05/27/2026    ANNUAL REVIEW OF HM ORDERS  05/27/2026    FALL RISK ASSESSMENT  05/27/2026    COLORECTAL CANCER SCREENING  08/15/2026    DIABETES SCREENING  08/01/2027    ADVANCE CARE PLANNING  04/02/2029    LIPID  08/01/2029    DEXA  04/12/2039    PHQ-2 (once per calendar year)  Completed    INFLUENZA VACCINE  Completed    Pneumococcal Vaccine: 50+ Years  Completed    ZOSTER IMMUNIZATION  Completed    RSV VACCINE  Completed    HPV IMMUNIZATION  Aged Out    MENINGITIS IMMUNIZATION  Aged Out     Review of Systems  CONSTITUTIONAL:  "NEGATIVE for fever, chills, change in weight; POSITIVE for 15 pound weight loss and diet changes due to prediabetes  INTEGUMENTARY/SKIN: POSITIVE for history of lichen sclerosis on her vulva with intermittent symptoms  EYES: NEGATIVE for vision changes or irritation  ENT/MOUTH: NEGATIVE for ear, mouth and throat problems  RESP: NEGATIVE for significant cough or SOB  BREAST: NEGATIVE for masses, tenderness or discharge  CV: NEGATIVE for chest pain, palpitations or peripheral edema  GI: NEGATIVE for nausea, abdominal pain, heartburn, or change in bowel habits  : NEGATIVE for frequency, dysuria, or hematuria  MUSCULOSKELETAL:POSITIVE  for having a couple falls, improvement from fracture in the hand but now seems paranoid from the fall and does not trust her feet is much.  She states that in December when she was put in a [employer] bookations she had this \"pain\" down the left hand and arm but that is the only time this occurred.  She feels that when she fell the last 2 times that she is catching herself on that left side which she always seems to protect her right arm.  NEURO: NEGATIVE for weakness, dizziness or paresthesias  ENDOCRINE: NEGATIVE for temperature intolerance, skin/hair changes  HEME: NEGATIVE for bleeding problems  PSYCHIATRIC: NEGATIVE for changes in mood or affect     Objective    Exam  /72   Pulse 100   Temp 97.3  F (36.3  C) (Tympanic)   Resp 14   Ht 1.496 m (4' 10.9\")   Wt 44.5 kg (98 lb)   SpO2 99%   BMI 19.86 kg/m     Estimated body mass index is 19.86 kg/m  as calculated from the following:    Height as of this encounter: 1.496 m (4' 10.9\").    Weight as of this encounter: 44.5 kg (98 lb).    Physical Exam  GENERAL: alert and no distress  EYES: Eyes grossly normal to inspection, PERRL and conjunctivae and sclerae normal  HENT: ear canals and TM's normal, nose and mouth without ulcers or lesions  NECK: no adenopathy, no asymmetry, masses, or scars  RESP: lungs clear to auscultation " - no rales, rhonchi or wheezes  CV: regular rate and rhythm, normal S1 S2, no S3 or S4, no murmur, click or rub, no peripheral edema  ABDOMEN: soft, nontender, no hepatosplenomegaly, no masses and bowel sounds normal  MS: no gross musculoskeletal defects noted, no edema  SKIN: no suspicious lesions or rashes  NEURO: Normal strength and tone, mentation intact and speech normal  PSYCH: mentation appears normal, affect normal/bright  LYMPH:  left submandibular lymph adenopathy that is not painful or tender         5/27/2025   Mini Cog   Clock Draw Score 2 Normal   3 Item Recall 3 objects recalled   Mini Cog Total Score 5     Signed Electronically by: Marnie Mitchell, NP

## 2025-05-27 NOTE — PATIENT INSTRUCTIONS
Use the clobetasol twice weekly to keep lichen sclerosis at bay.  Continue multivitamin gummie and add calcium supplement daily.  Ice/heat, rest, tylenol/ibuprofen, and make appointment with Physical Therapy to help shoulder and chest muscle issues on the left side.    Patient Education   Preventive Care Advice   This is general advice given by our system to help you stay healthy. However, your care team may have specific advice just for you. Please talk to your care team about your preventive care needs.  Nutrition  Eat 5 or more servings of fruits and vegetables each day.  Try wheat bread, brown rice and whole grain pasta (instead of white bread, rice, and pasta).  Get enough calcium 1200 mg and vitamin D 7724-1764 international unit(s) daily. Check the label on foods and aim for 100% of the RDA (recommended daily allowance).  Lifestyle  Exercise at least 150 minutes each week  (30 minutes a day, 5 days a week).  Do muscle strengthening activities 2 days a week. These help control your weight and prevent disease.  No smoking.  Wear sunscreen to prevent skin cancer.  Have a dental exam and cleaning every 6 months.  Yearly exams  See your health care team every year to talk about:  Any changes in your health.  Any medicines your care team has prescribed.  Preventive care, family planning, and ways to prevent chronic diseases.  Shots (vaccines)   HPV shots (up to age 26), if you've never had them before.  Hepatitis B shots (up to age 59), if you've never had them before.  COVID-19 shot: Get this shot when it's due.  Flu shot: Get a flu shot every year.  Tetanus shot: Get a tetanus shot every 10 years.  Pneumococcal, hepatitis A, and RSV shots: Ask your care team if you need these based on your risk.  Shingles shot (for age 50 and up)  General health tests  Diabetes screening:  Starting at age 35, Get screened for diabetes at least every 3 years.  If you are younger than age 35, ask your care team if you should be  screened for diabetes.  Cholesterol test: At age 39, start having a cholesterol test every 5 years, or more often if advised.  Bone density scan (DEXA): At age 50, ask your care team if you should have this scan for osteoporosis (brittle bones).  Hepatitis C: Get tested at least once in your life.  STIs (sexually transmitted infections)  Before age 24: Ask your care team if you should be screened for STIs.  After age 24: Get screened for STIs if you're at risk. You are at risk for STIs (including HIV) if:  You are sexually active with more than one person.  You don't use condoms every time.  You or a partner was diagnosed with a sexually transmitted infection.  If you are at risk for HIV, ask about PrEP medicine to prevent HIV.  Get tested for HIV at least once in your life, whether you are at risk for HIV or not.  Cancer screening tests  Cervical cancer screening: If you have a cervix, begin getting regular cervical cancer screening tests starting at age 21.  Breast cancer scan (mammogram): If you've ever had breasts, begin having regular mammograms starting at age 40. This is a scan to check for breast cancer.  Colon cancer screening: It is important to start screening for colon cancer at age 45.  Have a colonoscopy test every 10 years (or more often if you're at risk) Or, ask your provider about stool tests like a FIT test every year or Cologuard test every 3 years.  To learn more about your testing options, visit:   .  For help making a decision, visit:   https://bit.ly/th92990.  Prostate cancer screening test: If you have a prostate, ask your care team if a prostate cancer screening test (PSA) at age 55 is right for you.  Lung cancer screening: If you are a current or former smoker ages 50 to 80, ask your care team if ongoing lung cancer screenings are right for you.  For informational purposes only. Not to replace the advice of your health care provider. Copyright   2023 [employer]. All rights  reserved. Clinically reviewed by the [facility]itions [facility]. [employer] [id number] - REV 01/24.

## 2025-06-18 ENCOUNTER — THERAPY VISIT (OUTPATIENT)
Dept: PHYSICAL THERAPY | Facility: CLINIC | Age: 70
End: 2025-06-18
Attending: NURSE PRACTITIONER
Payer: MEDICARE

## 2025-06-18 DIAGNOSIS — S46.912D LEFT SHOULDER STRAIN, SUBSEQUENT ENCOUNTER: ICD-10-CM

## 2025-06-18 DIAGNOSIS — S29.011D CHEST WALL MUSCLE STRAIN, SUBSEQUENT ENCOUNTER: ICD-10-CM

## 2025-06-18 PROCEDURE — 97161 PT EVAL LOW COMPLEX 20 MIN: CPT | Mod: GP | Performed by: PHYSICAL THERAPIST

## 2025-06-18 PROCEDURE — 97110 THERAPEUTIC EXERCISES: CPT | Mod: GP | Performed by: PHYSICAL THERAPIST

## 2025-06-18 ASSESSMENT — ACTIVITIES OF DAILY LIVING (ADL)
PUTTING_ON_AN_UNDERSHIRT_OR_A_PULLOVER_SWEATER: 3
REMOVING_SOMETHING_FROM_YOUR_BACK_POCKET: 0
CARRYING_A_HEAVY_OBJECT_OF_10_POUNDS: 3
REACHING_FOR_SOMETHING_ON_A_HIGH_SHELF: 3
PLACING_AN_OBJECT_ON_A_HIGH_SHELF: 0
AT_ITS_WORST?: 2
PLEASE_INDICATE_YOR_PRIMARY_REASON_FOR_REFERRAL_TO_THERAPY:: SHOULDER
TOUCHING_THE_BACK_OF_YOUR_NECK: 0
WASHING_YOUR_HAIR?: 0
WASHING_YOUR_BACK: 2
WHEN_LYING_ON_THE_INVOLVED_SIDE: 1
PUTTING_ON_A_SHIRT_THAT_BUTTONS_DOWN_THE_FRONT: 0
PUTTING_ON_YOUR_PANTS: 0
PUSHING_WITH_THE_INVOLVED_ARM: 2

## 2025-06-18 NOTE — PROGRESS NOTES
PHYSICAL THERAPY EVALUATION  Type of Visit: Evaluation     Fall Risk Screen:  Have you fallen 2 or more times in the past year?: Yes  Have you fallen and had an injury in the past year?: Yes  Is patient receiving Physical Therapy Services?: Yes    Subjective   Reports that she had a fall where she broke her L hand.  Not sure if she hurt her shoulder when she fell.  Reports that she has had shoulder pain now for a few months.  Had shooting radiating symptoms down her arm to her wrist and into her breast/nipple with certain reaching movements.  Also reports the use of a blood pressure cuff increased symptoms a few weeks ago.        Presenting condition or subjective complaint: left upper arm, neck ,chest wall  Date of onset: 05/27/25 (date of referral)    Relevant medical history:   No past medical history on file.    Dates & types of surgery: right shoulder 2010    Prior diagnostic imaging/testing results:       Prior therapy history for the same diagnosis, illness or injury: No      Prior Level of Function  Transfers: Independent  Ambulation: Independent  ADL: Independent  IADL:     Living Environment  Social support: With a significant other or spouse   [facility] home: [facility]   Stairs to enter the home: No       Ramp: No   Stairs inside the home: No       Help at home: None  Equipment owned:       Employment: No    Hobbies/Interests: reading gardening    Patient goals for therapy: build strength    Pain assessment: Location: Left shoulder/Rating: Current: 1/10; Worst: 7/10     Objective   SHOULDER EVALUATION  INTEGUMENTARY (edema, incisions): WNL  POSTURE: Sitting Posture: Rounded shoulders, Forward head  ROM:   (Degrees) Left AROM Left PROM Right AROM  Right PROM   Shoulder Flexion 160*  165*    Shoulder Abduction 175*  175*    Shoulder Internal Rotation T7 - pain in anterior arm  T6    Shoulder External Rotation 80* - pain in anterior arm  85*    Shoulder Extension       Elbow Flexion       Elbow Extension                 STRENGTH:   Pain: - none + mild ++ moderate +++ severe  Strength Scale: 0-5/5 Left Right   Shoulder Flexion 4+ 5   Shoulder Extension 5 5   Shoulder Abduction 4 5-   Shoulder Adduction     Shoulder Internal Rotation 4 5   Shoulder External Rotation 4 5   Shoulder Horizontal Abduction     Shoulder Horizontal Adduction     Elbow Flexion 5 5   Elbow Extension 5 5   Mid Trap     Lower Trap 4 4   Rhomboid     Serratus Anterior       FLEXIBILITY: Decreased upper trap L, Decreased pectoralis major L, Decreased pectoralis minor L, L biceps  Special Tests:    Left Right   Impingement     Neer's Positive    Hawkin's-Flynn Positive    Coracoid Impingement     Internal impingement     Labral     Anterior Slide     Atkins's     Crank     Instability     Apprehension (anterior)     Relocation (anterior)     Anterior Load & Shift     Posterior Load & Shift     Posterior instability (with 90 degrees flex)     Multi-Directional Instability      Sulcus     Biceps      Speed's painful    Rotator Cuff Tear     Drop Arm Negative     Belly Press Negative     Lift off  Negative     Empty Can: (+) L  PALPATION: TTP deltoid insertion; supraspinatus insertion; biceps long head, pec major and minor muscle belly and insertion  JOINT MOBILITY:   CERVICAL SCREEN: Reports hx of cervical spine issues - currently working on cervical exercises for R sided neck pain    Balance  Static: good; pt reports catching feet with walking occastionally    Assessment & Plan   CLINICAL IMPRESSIONS  Medical Diagnosis: Left shoulder strain, subsequent encounter (L13.218G)    Chest wall muscle strain, subsequent encounter (A83.809E)    Treatment Diagnosis: L shoulder pain   Impression/Assessment: Patient is a 69 year old female with L shoulder pain complaints.  The following significant findings have been identified: Pain, Decreased ROM/flexibility, Decreased joint mobility, Decreased strength, Impaired balance, Impaired sensation, Impaired gait, and  Impaired posture. These impairments interfere with their ability to perform self care tasks, work tasks, recreational activities, and household chores as compared to previous level of function.     Clinical Decision Making (Complexity):  Clinical Presentation: Stable/Uncomplicated  Clinical Presentation Rationale: based on medical and personal factors listed in PT evaluation  Clinical Decision Making (Complexity): Low complexity    PLAN OF CARE  Treatment Interventions:  Modalities: Cryotherapy, Cupping, Dry Needling, E-stim, Hot Pack, Mechanical Traction, Ultrasound  Interventions: Gait Training, Manual Therapy, Neuromuscular Re-education, Therapeutic Activity, Therapeutic Exercise, Self-Care/Home Management    Long Term Goals     PT Goal 1  Goal Identifier: Reaching  Goal Description: Pt will report a 50% improvement in sytmpoms with reaching behind herself.  Rationale: to maximize safety and independence with performance of ADLs and functional tasks  Target Date: 07/30/25  PT Goal 2  Goal Identifier: Radiating symptoms  Goal Description: Pt will report a 75% reduction in radiating symptoms with reaching overhead and out to the side.  Rationale: to maximize safety and independence with performance of ADLs and functional tasks  Target Date: 08/27/25  PT Goal 3  Goal Identifier: Lifting  Goal Description: Pt will report tolerance to lifting 10# without an increase in symtpoms.  Target Date: 08/27/25      Frequency of Treatment: 1 x a week  Duration of Treatment: 10 weeks    Recommended Referrals to Other Professionals:   Education Assessment:   Learner/Method: Patient  Education Comments: anatomy of shoulder    Risks and benefits of evaluation/treatment have been explained.   Patient/Family/caregiver agrees with Plan of Care.     Evaluation Time:     PT Eval, Low Complexity Minutes (16076): 30       Signing Clinician: Rani Underwood, PT        [facility]                                                                                    OUTPATIENT PHYSICAL THERAPY      PLAN OF TREATMENT FOR OUTPATIENT REHABILITATION   Patient's Last Name, First Name, Magui Barron YOB: 1955   Provider's Name   [facility]   Medical Record No.  [id number]     Onset Date: 05/27/25 (date of referral)  Start of Care Date: 06/18/25     Medical Diagnosis:  Left shoulder strain, subsequent encounter (S46.912D)    Chest wall muscle strain, subsequent encounter (S29.011D)      PT Treatment Diagnosis:  L shoulder pain Plan of Treatment  Frequency/Duration: 1 x a week/ 10 weeks    Certification date from 06/18/25 to 08/27/25         See note for plan of treatment details and functional goals     Rani Underwood, PT                         I CERTIFY THE NEED FOR THESE SERVICES FURNISHED UNDER        THIS PLAN OF TREATMENT AND WHILE UNDER MY CARE     (Physician attestation of this document indicates review and certification of the therapy plan).              Referring Provider:  Marnie Mitchell    Initial Assessment  See Epic Evaluation- Start of Care Date: 06/18/25

## 2025-07-08 DIAGNOSIS — N90.4 LICHEN SCLEROSUS OF VULVA: ICD-10-CM

## 2025-07-10 RX ORDER — CLOBETASOL PROPIONATE 0.5 MG/G
OINTMENT TOPICAL AT BEDTIME
Qty: 60 G | Refills: 11 | Status: SHIPPED | OUTPATIENT
Start: 2025-07-10

## 2025-07-31 ENCOUNTER — THERAPY VISIT (OUTPATIENT)
Dept: PHYSICAL THERAPY | Facility: CLINIC | Age: 70
End: 2025-07-31
Attending: NURSE PRACTITIONER
Payer: MEDICARE

## 2025-07-31 DIAGNOSIS — S46.912D LEFT SHOULDER STRAIN, SUBSEQUENT ENCOUNTER: ICD-10-CM

## 2025-07-31 DIAGNOSIS — S29.011D CHEST WALL MUSCLE STRAIN, SUBSEQUENT ENCOUNTER: Primary | ICD-10-CM

## 2025-07-31 PROCEDURE — 97110 THERAPEUTIC EXERCISES: CPT | Mod: GP | Performed by: PHYSICAL MEDICINE & REHABILITATION

## 2025-07-31 NOTE — PROGRESS NOTES
Discharge Note  07/31/25 0500   Appointment Info   Signing clinician's name / credentials Love Madrigal, PT, DPT   Visits Used 2   Medical Diagnosis Left shoulder strain, subsequent encounter (S46.912D)    Chest wall muscle strain, subsequent encounter (S29.011D)   PT Tx Diagnosis L shoulder pain   Progress Note/Certification   Start of Care Date 06/18/25   Onset of illness/injury or Date of Surgery 05/27/25  (date of referral)   Therapy Frequency 1 x a week   Predicted Duration 10 weeks   Certification date from 06/18/25   Certification date to 08/27/25   Progress Note Due Date 08/27/25   GOALS   PT Goals 2;3   PT Goal 1   Goal Identifier Reaching   Goal Description Pt will report a 50% improvement in sytmpoms with reaching behind herself.   Rationale to maximize safety and independence with performance of ADLs and functional tasks   Target Date 07/30/25   Date Met 07/31/25   PT Goal 2   Goal Identifier Radiating symptoms   Goal Description Pt will report a 75% reduction in radiating symptoms with reaching overhead and out to the side.   Rationale to maximize safety and independence with performance of ADLs and functional tasks   Target Date 08/27/25   Date Met 07/31/25   PT Goal 3   Goal Identifier Lifting   Goal Description Pt will report tolerance to lifting 10# without an increase in symtpoms.   Target Date 08/27/25   Date Met 07/31/25   Subjective Report   Subjective Report Pt reports feeling 98% better. Notes HEP going well at home. Notes sleeping, lifting, raching and housework/yardwork all are better. Notes she plans to continue to progress towards remaining 2% independently with HEP   Objective Measures   Objective Measures Objective Measure 1   Objective Measure 1   Objective Measure L shoulder ROM   Details WNL   Treatment Interventions (PT)   Interventions Therapeutic Procedure/Exercise   Therapeutic Procedure/Exercise   Therapeutic Procedures: strength, endurance, ROM, flexibility minutes (76189)  30   Therapeutic Procedures Ther Proc 2;Ther Proc 3   Ther Proc 1 HEP review/intruct   Ther Proc 1 - Details pec stretch in doorway, shoulder IR and ER with red band (edu to progress to green band when red becomes easy), wall slides into flexion and abd, ecc bicep curl with 2#, scap sets; 10x1   Ther Proc 2 HEP progress/instruct   Ther Proc 2 - Details pec stretch in corner, low row with red band (progress to green when red becomes easy)   Ther Proc 3 pt edu   Ther Proc 3 - Details pt edu in regards to appropriate weaning of HEP with signs/symptoms of when to increase/decrease reps/sets/days to perform   Skilled Intervention HEP review/instruct/progress for D/C   Patient Response/Progress pt demonstrated and verbalized good understanding   Eval/Assessments   Assessments   (Pt has attended 2 PT sessions and has met 3/3 STG and LTG. Pt's ROM, strength and pain have improved and pt has returned to near full PLOF. Pt is appropriate for D/C as she has met all goals and is independent with HEP)   Education   Learner/Method Patient;Listening;Reading;Demonstration;Pictures/Video;No Barriers to Learning   Education Comments pt demonstrated and verbalized good understanding   Plan   Home program PTRx   Updates to plan of care D/C from PT   Total Session Time   Timed Code Treatment Minutes 30   Total Treatment Time (sum of timed and untimed services) 30       DISCHARGE  Reason for Discharge: Patient has met all goals.    Equipment Issued: therabands    Discharge Plan: Patient to continue home program.    Referring Provider:  Marnie Mitchell    Please contact me with any questions or concerns.    Thank you for your referral,     Love Madrigal, PT, DPT  Physical Therapist  [facility]  [address] [place] [id number] [phone]